# Patient Record
Sex: FEMALE | Race: WHITE | NOT HISPANIC OR LATINO | Employment: OTHER | URBAN - NONMETROPOLITAN AREA
[De-identification: names, ages, dates, MRNs, and addresses within clinical notes are randomized per-mention and may not be internally consistent; named-entity substitution may affect disease eponyms.]

---

## 2017-01-27 ENCOUNTER — CONSULT (OUTPATIENT)
Dept: CARDIOLOGY | Facility: CLINIC | Age: 67
End: 2017-01-27

## 2017-01-27 VITALS
WEIGHT: 190 LBS | HEART RATE: 64 BPM | SYSTOLIC BLOOD PRESSURE: 128 MMHG | DIASTOLIC BLOOD PRESSURE: 66 MMHG | BODY MASS INDEX: 33.66 KG/M2 | HEIGHT: 63 IN

## 2017-01-27 DIAGNOSIS — I48.0 PAROXYSMAL ATRIAL FIBRILLATION (HCC): Primary | ICD-10-CM

## 2017-01-27 DIAGNOSIS — R06.02 SHORTNESS OF BREATH: ICD-10-CM

## 2017-01-27 DIAGNOSIS — I10 ESSENTIAL HYPERTENSION: ICD-10-CM

## 2017-01-27 PROCEDURE — 93000 ELECTROCARDIOGRAM COMPLETE: CPT | Performed by: INTERNAL MEDICINE

## 2017-01-27 PROCEDURE — 99203 OFFICE O/P NEW LOW 30 MIN: CPT | Performed by: INTERNAL MEDICINE

## 2017-01-27 RX ORDER — LOSARTAN POTASSIUM AND HYDROCHLOROTHIAZIDE 12.5; 1 MG/1; MG/1
1 TABLET ORAL NIGHTLY
COMMUNITY
End: 2022-08-25 | Stop reason: DRUGHIGH

## 2017-01-27 RX ORDER — MELATONIN
1000 DAILY
COMMUNITY
End: 2017-06-27 | Stop reason: DRUGHIGH

## 2017-01-27 NOTE — PROGRESS NOTES
Geno Verduzco  1950  [phone]      01/27/17      [facility] CARDIOLOGY    Deena Jacobson, APRN  [address] [id number]    Chief Complaint   Patient presents with   • Atrial Fibrillation       Problem List:               1. Paroxysmal Atrial Fibrillation       A. CHADSVAsc = 2  On ASA      B. Echocardiogram 12/15/16: EF 55-60%, diastolic dysfunction, mild MR       C. GXT Cardiolite 9/15/09: normal exercise tolerance, no ischemia       D. 24 Hour Holter 2009: runs of PAF, no other arrythmias - initial diagnosis of atrial fibrillation      E. Sotalol and Tambocor caused side effects (worsened palpitations and apparent SVT)  2. Polymyalgia Rheumatica   3. HTN      History of Present Illness:   66 year old WF with history of PAF and HTN who has seen Dr. Mancilla in the past.  She has been doing very well from an atrial fibrillation standpoint. She is on Diltiazem and has not had an event for several years. She knows when she goes into atrial fibrillation, she feels rapid heart rates. The reason she presents today is to reestablish care and because she was having some SOB last fall. Since then, she has come off of prednisone which was used to treat PMR, and her SOB is gone. She saw Dr. Voss recently and had normal echo. Now, she is doing well with no SOB, BP, syncope, hospitalizations. Walks daily on the treadmill with no CP or SOB    Allergies   Allergen Reactions   • Cardizem [Diltiazem Hcl]      Is taking a blue color. Is allergic to the orange colored capsule.         Cannot display prior to admission medications because the patient has not been admitted in this contact.            Current Outpatient Prescriptions:   •  aspirin 81 MG EC tablet, Take 81 mg by mouth Daily., Disp: , Rfl:   •  cholecalciferol (VITAMIN D3) 1000 UNITS tablet, Take 1,000 Units by mouth Daily., Disp: , Rfl:   •  diltiaZEM (CARDIZEM) 120 MG tablet, Take 120 mg by mouth 2 (Two)  "Times a Day., Disp: , Rfl:   •  losartan-hydrochlorothiazide (HYZAAR) 100-12.5 MG per tablet, Take 0.5 tablets by mouth Daily., Disp: , Rfl:     Social History     Social History   • Marital status:      Spouse name: N/A   • Number of children: N/A   • Years of education: N/A     Social History Main Topics   • Smoking status: Never Smoker   • Smokeless tobacco: Never Used   • Alcohol use No   • Drug use: No   • Sexual activity: Not Asked     Other Topics Concern   • None     Social History Narrative       Family History   Problem Relation Age of Onset   • Heart disease Mother      PPM   • Hypertension Mother    • Stroke Mother    • Kidney failure Father    • Deep vein thrombosis Brother      Review of Systems   HENT: Negative.    Eyes: Negative.    Cardiovascular: Negative.    Respiratory: Negative.    Hematologic/Lymphatic: Negative.    Skin: Negative.    Musculoskeletal: Negative.    Gastrointestinal: Negative.    Genitourinary: Negative.    Neurological: Negative.    Psychiatric/Behavioral: Negative.        Vitals:    01/27/17 1259   BP: 128/66   BP Location: Left arm   Patient Position: Sitting   Pulse: 64   Weight: 190 lb (86.2 kg)   Height: 63\" (160 cm)       Physical Exam:  GEN: Well nourished, Well- developed  No acute distress  HEENT: Normocephalic, Atraumatic, PERRLA, moist mucous membranes  NECK: supple, NO JVD, no thyromegaly, no lymphadenopathy  CARD: S1S2  RRR no murmur, gallop, rub  LUNGS: Clear to auscultataion, normal respiratory effort  ABDOMEN: Soft, nontender, normal bowel sounds  EXTREMITIES:No gross deformities,  No clubbing, cyanosis, or edema  SKIN: Warm, dry  NEURO: No focal deficits  PSYCHIATRIC: Normal affect and mood      Data:                                        ECG 12 Lead  Date/Time: 1/27/2017 1:23 PM  Performed by: DEBORAH RUVALCABA  Authorized by: DEBORAH RUVALCABA   Rhythm: sinus rhythm  BPM: 64              Assessment and Plan:   1. Paroxysmal atrial fibrillation    2. " PAF (paroxysmal atrial fibrillation)    3. Essential hypertension    4. Shortness of breath      1. Paroxysmal Atrial Fibrillation- doing well on Diltiazem  CHADSvasc = 2 on ASA  2. HTN- controlled  3. SOB- now resolved, off prednisone, normal echocardiogram in December 2016    Scribed for Messi Mancilla MD by Aida Burgess PA-C. 1/27/2017  2:14 PM     I, Messi Mancilla MD, personally performed the services described in this documentation as scribed by the above named individual in my presence, and it is both accurate and complete.  1/27/2017  2:14 PM

## 2017-01-27 NOTE — LETTER
January 27, 2017     SHARITA Ruiz  [address] [address]    Patient: Geno Verduzco   YOB: 1950   Date of Visit: 1/27/2017       Dear SHARITA Cr:    Thank you for referring Geno Verduzco to me for evaluation. Below are the relevant portions of my assessment and plan of care.    If you have questions, please do not hesitate to call me. I look forward to following Geno along with you.         Sincerely,        Messi Mancilla MD        CC: No Recipients  Messi Mancilla MD  1/27/2017  2:16 PM  Sign at close encounter  Geno Verduzco  1950  [phone]      01/27/17      [facility] CARDIOLOGY    SHARITA Ruiz  [address] [place] [address] / [facility] [id number]    Chief Complaint   Patient presents with   • Atrial Fibrillation       Problem List:               1. Paroxysmal Atrial Fibrillation       A. CHADSVAsc = 2  On ASA      B. Echocardiogram 12/15/16: EF 55-60%, diastolic dysfunction, mild MR       C. GXT Cardiolite 9/15/09: normal exercise tolerance, no ischemia       D. 24 Hour Holter 2009: runs of PAF, no other arrythmias - initial diagnosis of atrial fibrillation      E. Sotalol and Tambocor caused side effects (worsened palpitations and apparent SVT)  2. Polymyalgia Rheumatica   3. HTN      History of Present Illness:   66 year old WF with history of PAF and HTN who has seen Dr. Mancilla in the past.  She has been doing very well from an atrial fibrillation standpoint. She is on Diltiazem and has not had an event for several years. She knows when she goes into atrial fibrillation, she feels rapid heart rates. The reason she presents today is to reestablish care and because she was having some SOB last fall. Since then, she has come off of prednisone which was used to treat PMR, and her SOB is gone. She saw Dr. Voss recently and had normal echo. Now, she is doing well with no SOB, BP, syncope, hospitalizations. Walks  "daily on the treadmill with no CP or SOB    Allergies   Allergen Reactions   • Cardizem [Diltiazem Hcl]      Is taking a blue color. Is allergic to the orange colored capsule.         Cannot display prior to admission medications because the patient has not been admitted in this contact.            Current Outpatient Prescriptions:   •  aspirin 81 MG EC tablet, Take 81 mg by mouth Daily., Disp: , Rfl:   •  cholecalciferol (VITAMIN D3) 1000 UNITS tablet, Take 1,000 Units by mouth Daily., Disp: , Rfl:   •  diltiaZEM (CARDIZEM) 120 MG tablet, Take 120 mg by mouth 2 (Two) Times a Day., Disp: , Rfl:   •  losartan-hydrochlorothiazide (HYZAAR) 100-12.5 MG per tablet, Take 0.5 tablets by mouth Daily., Disp: , Rfl:     Social History     Social History   • Marital status:      Spouse name: N/A   • Number of children: N/A   • Years of education: N/A     Social History Main Topics   • Smoking status: Never Smoker   • Smokeless tobacco: Never Used   • Alcohol use No   • Drug use: No   • Sexual activity: Not Asked     Other Topics Concern   • None     Social History Narrative       Family History   Problem Relation Age of Onset   • Heart disease Mother      PPM   • Hypertension Mother    • Stroke Mother    • Kidney failure Father    • Deep vein thrombosis Brother      Review of Systems   HENT: Negative.    Eyes: Negative.    Cardiovascular: Negative.    Respiratory: Negative.    Hematologic/Lymphatic: Negative.    Skin: Negative.    Musculoskeletal: Negative.    Gastrointestinal: Negative.    Genitourinary: Negative.    Neurological: Negative.    Psychiatric/Behavioral: Negative.        Vitals:    01/27/17 1259   BP: 128/66   BP Location: Left arm   Patient Position: Sitting   Pulse: 64   Weight: 190 lb (86.2 kg)   Height: 63\" (160 cm)       Physical Exam:  GEN: Well nourished, Well- developed  No acute distress  HEENT: Normocephalic, Atraumatic, PERRLA, moist mucous membranes  NECK: supple, NO JVD, no thyromegaly, no " lymphadenopathy  CARD: S1S2  RRR no murmur, gallop, rub  LUNGS: Clear to auscultataion, normal respiratory effort  ABDOMEN: Soft, nontender, normal bowel sounds  EXTREMITIES:No gross deformities,  No clubbing, cyanosis, or edema  SKIN: Warm, dry  NEURO: No focal deficits  PSYCHIATRIC: Normal affect and mood      Data:                                        ECG 12 Lead  Date/Time: 1/27/2017 1:23 PM  Performed by: DEBORAH MANCILLA  Authorized by: DEBORAH MANCILLA   Rhythm: sinus rhythm  BPM: 64              Assessment and Plan:   1. Paroxysmal atrial fibrillation    2. PAF (paroxysmal atrial fibrillation)    3. Essential hypertension    4. Shortness of breath      1. Paroxysmal Atrial Fibrillation- doing well on Diltiazem  CHADSvasc = 2 on ASA  2. HTN- controlled  3. SOB- now resolved, off prednisone, normal echocardiogram in December 2016    Scribed for Deborah Mancilla MD by Aida Burgess PA-C. 1/27/2017  2:14 PM     IDeborah MD, personally performed the services described in this documentation as scribed by the above named individual in my presence, and it is both accurate and complete.  1/27/2017  2:14 PM

## 2017-06-27 ENCOUNTER — OFFICE VISIT (OUTPATIENT)
Dept: CARDIOLOGY | Facility: CLINIC | Age: 67
End: 2017-06-27

## 2017-06-27 VITALS
WEIGHT: 187 LBS | HEIGHT: 63 IN | BODY MASS INDEX: 33.13 KG/M2 | HEART RATE: 60 BPM | SYSTOLIC BLOOD PRESSURE: 142 MMHG | DIASTOLIC BLOOD PRESSURE: 60 MMHG

## 2017-06-27 DIAGNOSIS — R00.2 PALPITATIONS: ICD-10-CM

## 2017-06-27 DIAGNOSIS — I48.0 PAF (PAROXYSMAL ATRIAL FIBRILLATION) (HCC): Primary | ICD-10-CM

## 2017-06-27 DIAGNOSIS — I10 ESSENTIAL HYPERTENSION: ICD-10-CM

## 2017-06-27 DIAGNOSIS — M35.3 POLYMYALGIA (HCC): ICD-10-CM

## 2017-06-27 DIAGNOSIS — R00.1 SINUS BRADYCARDIA: ICD-10-CM

## 2017-06-27 DIAGNOSIS — R06.02 SHORTNESS OF BREATH: ICD-10-CM

## 2017-06-27 PROCEDURE — 99213 OFFICE O/P EST LOW 20 MIN: CPT | Performed by: NURSE PRACTITIONER

## 2017-06-27 PROCEDURE — 93000 ELECTROCARDIOGRAM COMPLETE: CPT | Performed by: NURSE PRACTITIONER

## 2017-06-27 NOTE — PROGRESS NOTES
Chief Complaint   Patient presents with   • Follow-up     PCP writes refills on medication.   • Palpitations     She reports same as before, nothing any worse.       Subjective       Geno Verduzco is a 66 y.o. femalewho has history of hypertension and PAF for which she has seen Dr. Mancilla in the past. She has been treated with Cardizem and done fairly well. She has not followed up with Dr. Mancilla for many years. In 2016, she started noticing smothering sensation and increasing palpitation.  A regular stress test was done and everything was normal. Her previous cardiac workup was within normal limit. She also has been diagnosed with polymyalgia, treated with steroids during which time she gained a lot of weight and shortness of breath increased. After stopping the steroids, her weight improved and smothering sensation improved.   At her consultation with Dr. Voss in December 2016 a repeat echocardiogram was ordered and showed normal LV function and valvular structure. She was referred to Dr. Mancilla who felt she was stable and continued medical management.  Today she comes to the office for a follow up appointment and denies worsening palpitations or new cardiac symptoms.     HPI         Cardiac History:    Past Surgical History:   Procedure Laterality Date   • CARDIOVASCULAR STRESS TEST  09/15/2009    9 Min, 149 bpm. Negative   • CONVERTED (HISTORICAL) HOLTER  07/17/2009    AVG HR 71 BPM. PAF Seen   • ECHO - CONVERTED  07/03/2009    @[facility] Normal   • ECHO - CONVERTED  12/15/2016    Ef 55-60%, mild MR, RVSp 23 mmHg       Current Outpatient Prescriptions   Medication Sig Dispense Refill   • aspirin 81 MG EC tablet Take 81 mg by mouth 2 (Two) Times a Day.     • Cholecalciferol (VITAMIN D3) 5000 UNITS tablet tablet Take 5,000 Units by mouth 2 (Two) Times a Day.     • diltiaZEM (CARDIZEM) 120 MG tablet Take 120 mg by mouth 2 (Two) Times a Day.     • losartan-hydrochlorothiazide (HYZAAR) 100-12.5  MG per tablet Take 0.5 tablets by mouth Daily.       No current facility-administered medications for this visit.        Cardizem [diltiazem hcl]    Past Medical History:   Diagnosis Date   • Abnormal ECG    • Arrhythmia    • Bursitis    • Hypertension    • PAF (paroxysmal atrial fibrillation)    • Polymyalgia        Social History     Social History   • Marital status:      Spouse name: N/A   • Number of children: N/A   • Years of education: N/A     Occupational History   • Not on file.     Social History Main Topics   • Smoking status: Never Smoker   • Smokeless tobacco: Never Used   • Alcohol use No   • Drug use: No   • Sexual activity: Not on file     Other Topics Concern   • Not on file     Social History Narrative       Family History   Problem Relation Age of Onset   • Heart disease Mother      PPM   • Hypertension Mother    • Stroke Mother    • Kidney failure Father    • Deep vein thrombosis Brother        Review of Systems   Constitution: Negative for decreased appetite, fever and malaise/fatigue.   HENT: Negative for congestion and headaches.    Eyes: Negative for visual disturbance.   Cardiovascular: Positive for palpitations (mild). Negative for chest pain and dyspnea on exertion.   Respiratory: Negative for cough and shortness of breath.    Endocrine: Negative for polydipsia, polyphagia and polyuria.   Hematologic/Lymphatic: Negative for bleeding problem. Does not bruise/bleed easily.   Skin: Negative for color change and rash.   Musculoskeletal: Positive for arthritis, joint pain, joint swelling and myalgias. Negative for falls.   Gastrointestinal: Positive for hemorrhoids and melena. Negative for abdominal pain, constipation, heartburn and nausea.   Genitourinary: Negative for dysuria and hematuria.   Neurological: Negative for dizziness, light-headedness and numbness.   Psychiatric/Behavioral: The patient does not have insomnia and is not nervous/anxious.         Diabetes-  "No  Thyroid-normal    Objective     /60 (BP Location: Left arm)  Pulse 60  Ht 63\" (160 cm)  Wt 187 lb (84.8 kg)  BMI 33.13 kg/m2    Physical Exam   Constitutional: She is oriented to person, place, and time.   Eyes: Conjunctivae are normal. Pupils are equal, round, and reactive to light.   Neck: Neck supple. No JVD present. No thyromegaly present.   Cardiovascular: Normal rate, regular rhythm, S1 normal, S2 normal, normal heart sounds and normal pulses.    Pulmonary/Chest: Effort normal and breath sounds normal.   Abdominal: Soft. Bowel sounds are normal. She exhibits no distension. There is no tenderness.   Musculoskeletal: She exhibits no edema.   Neurological: She is alert and oriented to person, place, and time.   Skin: Skin is warm and dry.   Psychiatric: She has a normal mood and affect. Her behavior is normal. Judgment and thought content normal.   Vitals reviewed.           ECG 12 Lead  Date/Time: 6/27/2017 12:36 PM  Performed by: HAY GUAMAN  Authorized by: HAY GUAMAN   Comparison: compared with previous ECG from 12/13/2016  Comparison to previous ECG: sinus  Rhythm: sinus bradycardia  Rate: normal  BPM: 53  QRS axis: normal  Comments:  ms, QTc 379 ms                  Assessment/Plan      Geno was seen today for follow-up and palpitations.    Diagnoses and all orders for this visit:    PAF (paroxysmal atrial fibrillation)  -     ECG 12 Lead    Essential hypertension    Palpitations    Shortness of breath    Polymyalgia    Sinus bradycardia  -     ECG 12 Lead      MsKleber Verduzco remains stable from a cardiac standpoint. Her EKG shows sinus corby. Blood pressure stable. No changes to medication made today. She has developed issues with hemorrhoids and anticipates surgical intervention. If cardiac clearance needed she understands to contact the office.              Electronically signed by SHARITA Reddy,  June 27, 2017 5:00 PM  "

## 2017-10-13 ENCOUNTER — OFFICE VISIT (OUTPATIENT)
Dept: CARDIOLOGY | Facility: CLINIC | Age: 67
End: 2017-10-13

## 2017-10-13 VITALS
HEIGHT: 63 IN | HEART RATE: 58 BPM | DIASTOLIC BLOOD PRESSURE: 70 MMHG | SYSTOLIC BLOOD PRESSURE: 119 MMHG | BODY MASS INDEX: 33.66 KG/M2 | OXYGEN SATURATION: 100 % | WEIGHT: 190 LBS

## 2017-10-13 DIAGNOSIS — I48.0 PAF (PAROXYSMAL ATRIAL FIBRILLATION) (HCC): Primary | ICD-10-CM

## 2017-10-13 DIAGNOSIS — I10 ESSENTIAL HYPERTENSION: ICD-10-CM

## 2017-10-13 PROCEDURE — 99213 OFFICE O/P EST LOW 20 MIN: CPT | Performed by: INTERNAL MEDICINE

## 2017-10-13 NOTE — PROGRESS NOTES
"Geno Verduzco  1950  [phone]      10/13/2017    [facility] CARDIOLOGY     Davidperfectomynor Jacobson, APRN  [address] [id number]    Chief Complaint   Patient presents with   • Atrial Fibrillation       Problem List:            1. Paroxysmal Atrial Fibrillation       A. CHADSVAsc = 3  On ASA      B. Echocardiogram 12/15/16: EF 55-60%, diastolic dysfunction, mild MR       C. GXT Cardiolite 9/15/09: normal exercise tolerance, no ischemia       D. 24 Hour Holter 2009: runs of PAF, no other arrythmias - initial diagnosis of atrial fibrillation      E. Sotalol and Tambocor caused side effects (worsened palpitations and apparent SVT)  2. Polymyalgia Rheumatica   3. HTN       Allergies  Allergies   Allergen Reactions   • Bactrim [Sulfamethoxazole-Trimethoprim] Itching       Current Medications    Current Outpatient Prescriptions:   •  aspirin 81 MG EC tablet, Take 81 mg by mouth 2 (Two) Times a Day., Disp: , Rfl:   •  Cholecalciferol (VITAMIN D3) 5000 UNITS tablet tablet, Take 5,000 Units by mouth 2 (Two) Times a Day., Disp: , Rfl:   •  diltiaZEM (CARDIZEM) 120 MG tablet, Take 120 mg by mouth 2 (Two) Times a Day., Disp: , Rfl:   •  losartan-hydrochlorothiazide (HYZAAR) 100-12.5 MG per tablet, Take 0.5 tablets by mouth Daily., Disp: , Rfl:     History of Present Illness   HPI    Pt presents for follow up of AF/HTN. Since we last saw the pt, pt denies any AF episodes, SOB, CP, LH, and dizziness. Denies any hospitalizations, ER visits, bleeding, or TIA/CVA symptoms. Overall feels well. BP stable at home    ROS:  General:  Denies fatigue, weight gain or loss  Cardiovascular:  Denies CP, PND, syncope, near syncope, edema or palpitations.  Pulmonary:  Denies GIRON, cough, or wheezing      Vitals:    10/13/17 1254   BP: 119/70   BP Location: Left arm   Patient Position: Sitting   Pulse: 58   SpO2: 100%   Weight: 190 lb (86.2 kg)   Height: 63\" (160 cm)     PE:  General: NAD  Neck: no " JVD, no carotid bruits, no TM  Heart RRR, NL S1, S2, S4 present, no rubs, murmurs  Lungs: CTA, no wheezes, rhonchi, or rales  Abd: soft, non-tender, NL BS  Ext: No musculoskeletal deformities, no edema, cyanosis, or clubbing  Psych: normal mood and affect    Diagnostic Data:      Procedures    1. PAF (paroxysmal atrial fibrillation)    2. Essential hypertension          Plan:  1) PAF: Well controlled   Continue present medications.   2) Anticoagulation  Continue ASA, prn xarelto  3) HTN  Wt loss, exercise, salt reduction    F/up in 12 months

## 2018-01-25 ENCOUNTER — TELEPHONE (OUTPATIENT)
Dept: CARDIOLOGY | Facility: CLINIC | Age: 68
End: 2018-01-25

## 2018-01-25 NOTE — TELEPHONE ENCOUNTER
Pt calling to let us know she has been waking up with HR in the 50's and b/p 106/48. She is extremely tired, SOA. She takes Diltiazem 120mg 1 BID and Losartan/HCTZ 100mg/12.5mg 0.5mg daily. After talking to the pt she takes her losartan/ HCTZ around 1030-11pm at night. Advised pt to take her Losartan/HCTZ mid afternoon around 2-3pm and document her HR and B/P then call us back on Monday 29th.

## 2018-11-09 ENCOUNTER — OFFICE VISIT (OUTPATIENT)
Dept: CARDIOLOGY | Facility: CLINIC | Age: 68
End: 2018-11-09

## 2018-11-09 VITALS
DIASTOLIC BLOOD PRESSURE: 60 MMHG | HEIGHT: 63 IN | HEART RATE: 59 BPM | BODY MASS INDEX: 33.84 KG/M2 | WEIGHT: 191 LBS | SYSTOLIC BLOOD PRESSURE: 122 MMHG

## 2018-11-09 DIAGNOSIS — I48.0 PAF (PAROXYSMAL ATRIAL FIBRILLATION) (HCC): Primary | ICD-10-CM

## 2018-11-09 DIAGNOSIS — I10 ESSENTIAL HYPERTENSION: ICD-10-CM

## 2018-11-09 PROCEDURE — 99212 OFFICE O/P EST SF 10 MIN: CPT | Performed by: INTERNAL MEDICINE

## 2018-11-09 RX ORDER — MONTELUKAST SODIUM 10 MG/1
10 TABLET ORAL EVERY MORNING
COMMUNITY

## 2018-11-09 RX ORDER — ASCORBIC ACID 500 MG
500 TABLET ORAL DAILY
COMMUNITY
End: 2020-12-11

## 2018-11-09 NOTE — PROGRESS NOTES
Gneo Verduzco  1950  [phone]    11/09/2018    [facility] CARDIOLOGY     Deena Jacobson, APRN  [address] [id number]    Chief Complaint   Patient presents with   • Atrial Fibrillation       Problem List:             1. Paroxysmal Atrial Fibrillation       A. CHADSVAsc = 3  On ASA      B. Echocardiogram 12/15/16: EF 55-60%, diastolic dysfunction, mild MR       C. GXT Cardiolite 9/15/09: normal exercise tolerance, no ischemia       D. 24 Hour Holter 2009: runs of PAF, no other arrythmias - initial diagnosis of atrial fibrillation      E. Sotalol and Tambocor caused side effects (worsened palpitations and apparent SVT)  2. Polymyalgia Rheumatica   3. HTN       Allergies  Allergies   Allergen Reactions   • Bactrim [Sulfamethoxazole-Trimethoprim] Itching       Current Medications    Current Outpatient Prescriptions:   •  aspirin 81 MG EC tablet, Take 81 mg by mouth 2 (Two) Times a Day., Disp: , Rfl:   •  Cholecalciferol (VITAMIN D3) 5000 UNITS tablet tablet, Take 5,000 Units by mouth 2 (Two) Times a Day., Disp: , Rfl:   •  diltiaZEM (CARDIZEM) 120 MG tablet, Take 120 mg by mouth 2 (Two) Times a Day., Disp: , Rfl:   •  losartan-hydrochlorothiazide (HYZAAR) 100-12.5 MG per tablet, Take 0.5 tablets by mouth Daily., Disp: , Rfl:   •  montelukast (SINGULAIR) 10 MG tablet, Take 10 mg by mouth Every Night., Disp: , Rfl:   •  vitamin C (ASCORBIC ACID) 500 MG tablet, Take 500 mg by mouth Daily., Disp: , Rfl:     History of Present Illness     Pt presents for follow up of AF/HTN . Since we last saw the pt, pt denies any AF episodes, SOB, CP, LH, and dizziness. Denies any hospitalizations, ER visits, bleeding, or TIA/CVA symptoms. Overall feels well. BP at home stable. Rare skipped beats. Takes predsione at times due to PMR.    ROS:  General:  Denies fatigue, + weight gain   Cardiovascular:  Denies CP, PND, syncope, near syncope, edema + rare palpitations.  Pulmonary:   "Denies GIRON, cough, or wheezing      Vitals:    11/09/18 1104   BP: 122/60   BP Location: Left arm   Patient Position: Sitting   Pulse: 59   Weight: 86.6 kg (191 lb)   Height: 160 cm (63\")     Body mass index is 33.83 kg/m².  PE:  General: NAD  Neck: no JVD, no carotid bruits, no TM  Heart RRR, NL S1, S2, S4 present, no rubs, murmurs  Lungs: CTA, no wheezes, rhonchi, or rales  Abd: soft, non-tender, NL BS  Ext: No musculoskeletal deformities, no edema, cyanosis, or clubbing  Psych: normal mood and affect    Diagnostic Data:      Procedures    1. PAF (paroxysmal atrial fibrillation) (CMS/HCC)    2. Essential hypertension          Plan:  1) PAF: no recurrence  Continue present medications.   2) Anticoagulation  Continue ASA  3) HTN: well controlled on meds  Wt loss, exercise, salt reduction    F/up in 12 months      "

## 2019-11-08 ENCOUNTER — OFFICE VISIT (OUTPATIENT)
Dept: CARDIOLOGY | Facility: CLINIC | Age: 69
End: 2019-11-08

## 2019-11-08 VITALS
BODY MASS INDEX: 32.78 KG/M2 | HEIGHT: 63 IN | DIASTOLIC BLOOD PRESSURE: 77 MMHG | SYSTOLIC BLOOD PRESSURE: 125 MMHG | WEIGHT: 185 LBS | HEART RATE: 68 BPM

## 2019-11-08 DIAGNOSIS — I48.0 PAF (PAROXYSMAL ATRIAL FIBRILLATION) (HCC): Primary | ICD-10-CM

## 2019-11-08 DIAGNOSIS — I10 ESSENTIAL HYPERTENSION: ICD-10-CM

## 2019-11-08 PROCEDURE — 99213 OFFICE O/P EST LOW 20 MIN: CPT | Performed by: INTERNAL MEDICINE

## 2019-11-08 NOTE — PROGRESS NOTES
Geno Verduzco  1950  Home phone not available    11/08/2019    [facility] CARDIOLOGY     Deena Jacobson, APRN  [address] [place] [id number]    Chief Complaint   Patient presents with   • Paroxysmal atrial fibrillation        Problem List:             1. Paroxysmal Atrial Fibrillation       A. CHADSVAsc = 3  On ASA      B. Echocardiogram 12/15/16: EF 55-60%, diastolic dysfunction, mild MR       C. GXT Cardiolite 9/15/09: normal exercise tolerance, no ischemia       D. 24 Hour Holter 2009: runs of PAF, no other arrythmias - initial diagnosis of atrial fibrillation      E. Sotalol and Tambocor caused side effects (worsened palpitations and apparent SVT)  2. Polymyalgia Rheumatica   3. HTN         Allergies  Allergies   Allergen Reactions   • Bactrim [Sulfamethoxazole-Trimethoprim] Itching       Current Medications    Current Outpatient Medications:   •  aspirin 81 MG EC tablet, Take 81 mg by mouth 2 (Two) Times a Day., Disp: , Rfl:   •  Cholecalciferol (VITAMIN D3) 5000 UNITS tablet tablet, Take 5,000 Units by mouth 2 (Two) Times a Day., Disp: , Rfl:   •  diltiaZEM (CARDIZEM) 120 MG tablet, Take 120 mg by mouth 2 (Two) Times a Day., Disp: , Rfl:   •  losartan-hydrochlorothiazide (HYZAAR) 100-12.5 MG per tablet, Take 0.5 tablets by mouth Daily., Disp: , Rfl:   •  montelukast (SINGULAIR) 10 MG tablet, Take 10 mg by mouth Every Night., Disp: , Rfl:   •  vitamin C (ASCORBIC ACID) 500 MG tablet, Take 500 mg by mouth Daily., Disp: , Rfl:     History of Present Illness     Pt presents for follow up of AF, HTN. Since we last saw the pt, pt denies any AF episodes, SOB, CP, LH, and dizziness. Denies any hospitalizations, ER visits, bleeding, or TIA/CVA symptoms. Overall feels well.BP is well controlled.     ROS:  General:  Denies fatigue, weight gain or loss  Cardiovascular:  Denies CP, PND, syncope, near syncope, edema or palpitations.  Pulmonary:  Denies GIRON, cough, or  "wheezing      Vitals:    11/08/19 1341   BP: 125/77   BP Location: Left arm   Patient Position: Sitting   Pulse: 68   Weight: 83.9 kg (185 lb)   Height: 160 cm (63\")     Body mass index is 32.77 kg/m².  PE:  General: NAD. A & O x 3  Neck: no JVD, no carotid bruits, no TM  Heart RRR, NL S1, S2, S4 present, no rubs, murmurs  Lungs: CTA, no wheezes, rhonchi, or rales  Abd: soft, non-tender, NL BS  Ext: No musculoskeletal deformities, no edema, cyanosis, or clubbing  Psych: normal mood and affect    Diagnostic Data:      Procedures    1. PAF (paroxysmal atrial fibrillation) (CMS/HCC)    2. Essential hypertension          Plan:  1) PAF: no recurrence  Continue present medications.   2) Anticoagulation  Continue ASA  3) HTN: well controlled on meds  Wt loss, exercise, salt reduction    F/up in 12 months    Electronically signed by ANDREW Thomas, 11/08/19, 2:09 PM.    I, Messi Mancilla MD, personally performed the services described in this documentation as scribed by the above named individual in my presence, and it is both accurate and complete.  11/8/2019  2:19 PM    "

## 2020-12-11 ENCOUNTER — OFFICE VISIT (OUTPATIENT)
Dept: CARDIOLOGY | Facility: CLINIC | Age: 70
End: 2020-12-11

## 2020-12-11 VITALS
OXYGEN SATURATION: 97 % | SYSTOLIC BLOOD PRESSURE: 128 MMHG | WEIGHT: 180 LBS | BODY MASS INDEX: 31.89 KG/M2 | HEART RATE: 71 BPM | DIASTOLIC BLOOD PRESSURE: 60 MMHG | HEIGHT: 63 IN

## 2020-12-11 DIAGNOSIS — M35.3 POLYMYALGIA (HCC): ICD-10-CM

## 2020-12-11 DIAGNOSIS — I10 ESSENTIAL HYPERTENSION: ICD-10-CM

## 2020-12-11 DIAGNOSIS — R06.02 SHORTNESS OF BREATH: ICD-10-CM

## 2020-12-11 DIAGNOSIS — I48.0 PAF (PAROXYSMAL ATRIAL FIBRILLATION) (HCC): Primary | ICD-10-CM

## 2020-12-11 DIAGNOSIS — R00.2 PALPITATIONS: ICD-10-CM

## 2020-12-11 PROCEDURE — 99214 OFFICE O/P EST MOD 30 MIN: CPT | Performed by: INTERNAL MEDICINE

## 2020-12-11 RX ORDER — DILTIAZEM HYDROCHLORIDE 180 MG/1
180 CAPSULE, COATED, EXTENDED RELEASE ORAL 2 TIMES DAILY
COMMUNITY

## 2020-12-11 RX ORDER — MULTIPLE VITAMINS W/ MINERALS TAB 9MG-400MCG
1 TAB ORAL DAILY
COMMUNITY

## 2020-12-11 NOTE — PROGRESS NOTES
Cardiac Electrophysiology Outpatient Follow Up Note            [facility] at [facility]    Follow Up Office Visit      Geno Verduzco  [id number]  2020  [unfilled]  [unfilled]    Primary Care Physician: Deena Jacobson APRN    Referred By: No ref. provider found    Subjective     Chief Complaint: Paroxysmal atrial fibrillation, palpitations, hypertension  Chief Complaint   Patient presents with   • PAF     Problem List:             1. Paroxysmal Atrial Fibrillation       A. CHADSVAsc = 3  On ASA      B. Echocardiogram 12/15/16: EF 55-60%, diastolic dysfunction, mild MR       C. GXT Cardiolite 9/15/09: normal exercise tolerance, no ischemia       D. 24 Hour Holter : runs of PAF, no other arrythmias - initial diagnosis of atrial fibrillation      E. Sotalol and Tambocor caused side effects (worsened palpitations and apparent SVT)  2. Polymyalgia Rheumatica   3. HTN         History of Present Illness:   Geno Verduzco is a 70 y.o. female who presents to my electrophysiology clinic for follow up of the above complaints.  She is overall doing well.  She occasionally has some palpitations.  Been several years since she was noted to have a full episode of atrial fibrillation.  She remembers A. fib episodes were associated with severe palpitations this was at the time of the death of her  and father.  Since then she thinks that she has had really no severe episodes like that but does note that she has had episodes of feeling her heartbeat irregularly erratically especially in the evening but sometimes during the day.  The episodes are now a several minutes.  No shortness of breath associated with them.  She wonders if there is fibrillation.  She has had several family members who have really suffered and even  from stroke due to atrial fibrillation and she is very afraid about this.    No chest pain nausea vomiting fevers or chills syncope  presyncope    Review of Systems:   Constitutional: No fevers or chills, no recent weight gain or weight loss or fatigue  Eyes: No visual loss, blurred vision, double vision, yellow sclerae.  ENT: No headaches, hearing loss, vertigo, congestion or sore throat.   Cardiovascular: Per HPI  Respiratory: No cough or wheezing, no sputum production, no hematemesis   Gastrointestinal: No abdominal pain, no nausea, vomiting, constipation, diarrhea, melena.   Genitourinary: No dysuria, hematuria or increased frequency.  Musculoskeletal:  No gait disturbance, weakness or joint pain or stiffness  Integumentary: No rashes, urticaria, ulcers or sores.   Neurological: No headache, dizziness, syncope, paralysis, ataxia, no prior CVA/TIA  Psychiatric: No anxiety, or depression  Endocrine: No diaphoresis, cold or heat intolerance. No polyuria or polydipsia.   Hematologic/Lymphatic: No anemia, abnormal bruising or bleeding. No history of DVT/PE.      Past Medical History:   Past Medical History:   Diagnosis Date   • Abnormal ECG    • Arrhythmia    • Bursitis    • Hypertension    • PAF (paroxysmal atrial fibrillation) (CMS/HCC)    • Polymyalgia (CMS/HCC)        Past Surgical History:   Past Surgical History:   Procedure Laterality Date   • CARDIOVASCULAR STRESS TEST  09/15/2009    9 Min, 149 bpm. Negative   • CONVERTED (HISTORICAL) HOLTER  07/17/2009    AVG HR 71 BPM. PAF Seen   • ECHO - CONVERTED  07/03/2009    @[facility] Normal   • ECHO - CONVERTED  12/15/2016    Ef 55-60%, mild MR, RVSp 23 mmHg       Family History:   Family History   Problem Relation Age of Onset   • Heart disease Mother         PPM   • Hypertension Mother    • Stroke Mother    • Kidney failure Father    • Deep vein thrombosis Brother        Social History:   Social History     Socioeconomic History   • Marital status:      Spouse name: Not on file   • Number of children: Not on file   • Years of education: Not on file   • Highest education level: Not  "on file   Tobacco Use   • Smoking status: Never Smoker   • Smokeless tobacco: Never Used   Substance and Sexual Activity   • Alcohol use: No   • Drug use: No   • Sexual activity: Defer       Medications:     Current Outpatient Medications:   •  aspirin 81 MG EC tablet, Take 81 mg by mouth 2 (Two) Times a Day., Disp: , Rfl:   •  Cholecalciferol (VITAMIN D3) 5000 UNITS tablet tablet, Take 5,000 Units by mouth 2 (Two) Times a Day., Disp: , Rfl:   •  dilTIAZem CD (CARDIZEM CD) 180 MG 24 hr capsule, Take 180 mg by mouth 2 (two) times a day., Disp: , Rfl:   •  losartan-hydrochlorothiazide (HYZAAR) 100-12.5 MG per tablet, Take 0.5 tablets by mouth Every Night., Disp: , Rfl:   •  montelukast (SINGULAIR) 10 MG tablet, Take 10 mg by mouth Every Night., Disp: , Rfl:   •  multivitamin with minerals (Multivitamin Adults 50+) tablet tablet, Take 1 tablet by mouth Daily., Disp: , Rfl:     Allergies:   Allergies   Allergen Reactions   • Bactrim [Sulfamethoxazole-Trimethoprim] Itching       Objective     Physical Exam:  Vital Signs:   Vitals:    12/11/20 1342   BP: 128/60   BP Location: Right arm   Patient Position: Sitting   Pulse: 71   SpO2: 97%   Weight: 81.6 kg (180 lb)   Height: 160 cm (63\")     GEN: Well nourished, well-developed, no acute distress  HEENT: Normocephalic, atraumatic, moist mucous membranes  NECK: Supple, no JVD, no thyromegaly, no lymphadenopathy  CARD: Regular rate and rhythm, normal S1 & S2 are present.  No murmur, gallop or rubs are appreciated.  LUNGS: Clear to auscultation bilateraly, normal respiratory effort  ABDOMEN: Soft, nontender, normal bowel sounds  EXTREMITIES: No gross deformities, no clubbing, cyanosis.  Edema none  SKIN: Warm, dry  NEURO: No focal deficits, alert and oriented x 3  PSYCHIATRIC: Normal affect and mood, appropriate use of semantics and logic.        No results found for: GLUCOSE, CALCIUM, NA, K, CO2, CL, BUN, CREATININE, EGFRIFAFRI, EGFRIFNONA, BCR, ANIONGAP  No results found for: " WBC, HGB, HCT, MCV, PLT  No results found for: INR, PROTIME  No results found for: TSH, F9CBNXX, U5YBAPM, THYROIDAB    Cardiac Testing:     I personally viewed and interpreted the patient's EKG/Telemetry/lab data    Procedures    Tobacco Cessation: N/A  Obstructive Sleep Apnea Screening: N/A    Assessment & Plan        Diagnoses and all orders for this visit:    1. PAF (paroxysmal atrial fibrillation) (CMS/HCC) (Primary)    2. Essential hypertension    3. Shortness of breath    4. Palpitations    5. Polymyalgia (CMS/HCC)         Diagnosis Plan   1. PAF (paroxysmal atrial fibrillation) (CMS/HCC)   ongoing symptoms of palpitations these may or may not be due to atrial fibrillation.  She has [id number] of 3.  If she does indeed have atrial fibrillation this would lead us towards anticoagulating her once again.  We lengthy conversation.  Unless very able to document atrial fibrillation she really does not want to start anticoagulation.  This seems reasonable.  We discussed the option of continued heart rhythm monitoring with an ambulatory device versus implanted loop monitor.  After very careful discussion the risk-benefit profile she wishes to proceed with implanted loop monitor.  I think this is quite reasonable as it would answer the question of should she be anticoagulated.   2. Essential hypertension   blood pressure well controlled at this time.  Home blood pressure vital signs were reviewed.   3. Shortness of breath   this does not seem to be related to arrhythmia.  This seems to occur in the morning that subsides with any ambulation.   4. Palpitations   noted above.   5. Polymyalgia (CMS/HCC)   no issues at this time.         Follow Up:     All over with her in a year.  Schedule her for an implanted loop monitor in the near future in [place]..      Thank you for allowing me to participate in the care of your patient. Please to not hesitate to contact me with additional questions or concerns.        Jimenez ARAMBULA  DO Vasu, FACC, RS  Cardiac Electrophysiologist

## 2020-12-30 ENCOUNTER — PREP FOR SURGERY (OUTPATIENT)
Dept: OTHER | Facility: HOSPITAL | Age: 70
End: 2020-12-30

## 2020-12-30 DIAGNOSIS — I48.0 PAROXYSMAL ATRIAL FIBRILLATION (HCC): Primary | ICD-10-CM

## 2020-12-30 RX ORDER — SODIUM CHLORIDE 0.9 % (FLUSH) 0.9 %
1-10 SYRINGE (ML) INJECTION AS NEEDED
Status: CANCELLED | OUTPATIENT
Start: 2020-12-30

## 2020-12-30 RX ORDER — NITROGLYCERIN 0.4 MG/1
0.4 TABLET SUBLINGUAL
Status: CANCELLED | OUTPATIENT
Start: 2020-12-30

## 2020-12-30 RX ORDER — ACETAMINOPHEN 325 MG/1
650 TABLET ORAL EVERY 4 HOURS PRN
Status: CANCELLED | OUTPATIENT
Start: 2020-12-30

## 2020-12-30 RX ORDER — SODIUM CHLORIDE 0.9 % (FLUSH) 0.9 %
3 SYRINGE (ML) INJECTION EVERY 12 HOURS SCHEDULED
Status: CANCELLED | OUTPATIENT
Start: 2020-12-30

## 2021-02-10 ENCOUNTER — HOSPITAL ENCOUNTER (OUTPATIENT)
Dept: CARDIOLOGY | Facility: HOSPITAL | Age: 71
Discharge: HOME | End: 2021-02-10
Attending: INTERNAL MEDICINE | Admitting: INTERNAL MEDICINE

## 2021-02-10 VITALS
RESPIRATION RATE: 16 BRPM | OXYGEN SATURATION: 99 % | WEIGHT: 186 LBS | HEIGHT: 63 IN | SYSTOLIC BLOOD PRESSURE: 157 MMHG | TEMPERATURE: 98 F | DIASTOLIC BLOOD PRESSURE: 67 MMHG | BODY MASS INDEX: 32.96 KG/M2 | HEART RATE: 62 BPM

## 2021-02-10 DIAGNOSIS — I48.0 PAF (PAROXYSMAL ATRIAL FIBRILLATION) (HCC): ICD-10-CM

## 2021-02-10 PROCEDURE — 33285 INSJ SUBQ CAR RHYTHM MNTR: CPT | Performed by: INTERNAL MEDICINE

## 2021-02-10 PROCEDURE — C1764 EVENT RECORDER, CARDIAC: HCPCS

## 2021-02-10 PROCEDURE — 33285 INSJ SUBQ CAR RHYTHM MNTR: CPT

## 2021-02-10 NOTE — H&P
Cardiac Electrophysiology History and Physical          [facility]    History & Physical      PATIENT NAME  Geno Verduzco    :  1950 AGE: 70 y.o.    ADMISSION DATE: 2/10/2021     Subjective      CC:  Palpitations    PROBLEM LIST:    1. Paroxysmal atrial fibrillation  a. CHADSVASc = 3, on aspirin only  b. Diagnosed  on 24 Hour Holter with runs of PAF, no other arrythmias   c. GXT Cardiolite 9/15/09: normal exercise tolerance, no ischemia.  d. Failed Sotalol and Tambocor due to side effects (worsened palpitations and apparent SVT)  e. Echocardiogram 12/15/16: EF 55-60%, diastolic dysfunction, mild MR.  f. Rate controlled on Cardizem therapy without documented recurrence.  2. Essential hypertension  3. Polymyalgia rheumatica  4. Bursitis      HISTORY OF PRESENT ILLNESS: Ms. Geno Verduzco is a 70-year-old white female who presents today for elective loop recorder device implant.  Patient was recently seen in EP follow-up regarding her past medical history of atrial fibrillation with reported increased palpitations without documented episodes of recurrent atrial fibrillation to warrant initiation of long-term anticoagulation therapy.  Options for continued monitoring were reviewed with decision to pursue loop recorder implant for which she presents for today.  Upon presentation patient reports doing well overall from a cardiovascular standpoint.  Patient continues to admit to intermittent palpitations lasting up to several minutes in duration 3-4 times per week.  Patient denies associated symptoms of chest pain, dizziness, shortness of breath, or presyncope.  Patient denies TIA/strokelike symptoms.  Patient denies recent infections or signs of fever, chills, sweats, or cough.  Patient denies recent sick contacts.      PAST MEDICAL HISTORY  Past Medical History:   Diagnosis Date   • Abnormal ECG    • Arrhythmia    • Bursitis    • Hypertension    • PAF  (paroxysmal atrial fibrillation) (CMS/[facility])    • Polymyalgia (CMS/HCC)        SURGICAL HISTORY   has a past surgical history that includes Cardiovascular stress test (09/15/2009); Echo - Converted (07/03/2009); converted (historical) holter (07/17/2009); Echo - Converted (12/15/2016); and Foot surgery (Left).     SOCIAL HISTORY  Social History     Socioeconomic History   • Marital status:      Spouse name: Not on file   • Number of children: Not on file   • Years of education: Not on file   • Highest education level: Not on file   Tobacco Use   • Smoking status: Never Smoker   • Smokeless tobacco: Never Used   Substance and Sexual Activity   • Alcohol use: No   • Drug use: No   • Sexual activity: Defer       FAMILY HISTORY  family history includes Deep vein thrombosis in her brother; Heart disease in her mother; Hypertension in her mother; Kidney failure in her father; Stroke in her mother.     MEDICATIONS  Prior to Admission medications    Medication Sig Start Date End Date Taking? Authorizing Provider   aspirin 81 MG EC tablet Take 81 mg by mouth Daily.   Yes Khai Ng MD   Cholecalciferol (VITAMIN D3) 5000 UNITS tablet tablet Take 5,000 Units by mouth 2 (Two) Times a Day.   Yes Khai Ng MD   dilTIAZem CD (CARDIZEM CD) 180 MG 24 hr capsule Take 180 mg by mouth 2 (two) times a day.   Yes Khai Ng MD   losartan-hydrochlorothiazide (HYZAAR) 100-12.5 MG per tablet Take 0.5 tablets by mouth Every Night.   Yes Khai Ng MD   montelukast (SINGULAIR) 10 MG tablet Take 10 mg by mouth Every Night.   Yes Khai Ng MD   multivitamin with minerals (Multivitamin Adults 50+) tablet tablet Take 1 tablet by mouth Daily.   Yes Khai Ng MD       ALLERGIES  Allergies   Allergen Reactions   • Bactrim [Sulfamethoxazole-Trimethoprim] Itching       REVIEW OF SYSTEMS  Constitutional: No fevers or chills, no recent weight gain or weight loss or fatigue  Eyes:  "No visual loss, blurred vision, double vision, yellow sclerae.  ENT: No headaches, hearing loss, vertigo, congestion or sore throat.   Cardiovascular: Per HPI  Respiratory: No cough or wheezing, no sputum production, no hematemesis   Gastrointestinal: No abdominal pain, no nausea, vomiting, constipation, diarrhea, melena.   Genitourinary: No dysuria, hematuria or increased frequency.  Musculoskeletal:  No gait disturbance, weakness or joint pain or stiffness  Integumentary: No rashes, urticaria, ulcers or sores.   Neurological: No headache, dizziness, syncope, paralysis, ataxia, no prior CVA/TIA  Psychiatric: No anxiety, or depression  Endocrine: No diaphoresis, cold or heat intolerance. No polyuria or polydipsia.   Hematologic/Lymphatic: No anemia, abnormal bruising or bleeding. No history of DVT/PE.      Objective      PHYSICAL EXAM    Vital Signs: /67 mmHg, HR 55 bpm, SPO2 99% RA.    General appearance: Awake, alert, cooperative  Head: Normocephalic, without obvious abnormality, atraumatic  Eyes: Conjunctivae/corneas clear, EOM's intact  Neck: no adenopathy, no carotid bruit, no JVD and thyroid: not enlarged  Lungs: clear to auscultation bilaterally and no rhonchi or crackles\", ' symmetric  Heart: regular rate and rhythm, S1, S2 normal, no murmur, click, rub or gallop  Abdomen: Soft, non-tender. Bowel sounds normal,  no organomegaly  Extremities: extremities normal, atraumatic, no cyanosis or edema  Skin: Skin color, turgor normal, no rashes or lesions  Neurologic: Grossly normal           TELEMETRY: SB 55 bpm    Assessment & Plan     Ms. Geno Verduzco is a 70-year-old white female who presents today for elective loop recorder device implant.  Patient was recently seen in EP follow-up regarding her past medical history of atrial fibrillation with reported increased palpitations without documented episodes of recurrent atrial fibrillation to warrant initiation of long-term anticoagulation therapy.  " Options for continued monitoring were reviewed with decision to pursue loop recorder implant for which she presents for today.  All risk, benefits, and alternatives to the procedure were outlined reviewed with patient in clinic and again here at bedside today.  Patient verbalizes complete understanding of the procedure and is willing to proceed as scheduled.  Patient has a negative Covid screening documented with the last 72 hours.      Electronically signed by SHARITA Roa, 02/10/21, 11:53 AM EST.      This note was completed using a voice transcription system. Every effort was made to ensure accuracy. However, inadvertent computerized transcription errors may be present.

## 2021-02-10 NOTE — OP NOTE
Cardiac Electrophysiology Procedure Note          [facility]    PROCEDURE: CARDIAC MEMORY LOOP RECORDER IMPLANTATION (Implanted Loop Monitor or ILR)    OPERATION PERFORMED: Implantation of a Medtronic cardiac memory loop recorder with serial number anterior at the anterior chest wall.    ATTENDING SURGEON: Jimenez Leone DO    ANESTHESIA: local anesthesia with lidocaine and bupivicaine were administered subcutaneously to the implant area.     ESTIMATED BLOOD LOSS: minimal (less than 5 cc)    COMPLICATIONS: None.  TIME OUT: Time out was completed with verification of the correct patient identity, procedure to be performed, procedure site and implanted equipment.    INDICATION FOR PROCEDURE:  Briefly, the patient is aditi Verduzco is a 70 y.o. year old female with a history of paroxysmal atrial fibrillation.  The patient was seen and examined by an electrophysiology staff physician and deemed appropriate for implantation of a memory loop recorder.    The patient was able to give written informed consent after revisiting the key portions of the risk versus benefit profile of the procedure.  This discussion was framed by our lengthy conversations  (please see our detailed notes).  Patient verbalized strong understanding of this discussion and a strong desire to proceed with the procedure.  Please note that this detailed informed consent process utilized mutual and shared decision making process between all parties involved, principally the physician and patient, but also potentially with input from the patient's selected family and friends.    PROCEDURE AND FINDINGS:  The patient was brought to the electrophysiology suite.  Informed consent was obtained prior to the procedure and confirmed.  The site of implantation was prepped and draped in the usual sterile fashion.   Adequate local anesthesia was given: the skin at the site of implantation was infiltrated with 1%  lidocaine and 0.5% bupivicaine 50/50 mixture.    The Medtronic ILR was percutaneously injected into the subcutaneous space using the supplied implant tools in routine fashion.  The wound was closed with surgical adhesive and a sterile occlusive dressing.    CONCLUSION:  Successful implantation of cardiac memory loop recorder system.      FOLLOW UP AND RECOMMENDATIONS:  The patient may remove the dressing later today.  The patient may shower later today.  No wound check is required.   The patient will follow up with  Me as scheduled.

## 2021-02-12 ENCOUNTER — TELEPHONE (OUTPATIENT)
Dept: CARDIOLOGY | Facility: CLINIC | Age: 71
End: 2021-02-12

## 2021-02-12 NOTE — TELEPHONE ENCOUNTER
Patient had a loop recorder placed on 2/10/21. She states that the area is bruised and swollen. She noticed today that she specifically has a swollen, tender knot that is about one inch long at the incision site. She denies any redness, red streaks, drainage, fever or chills. She is going to monitor the area over the weekend and call us back on Monday if she has any more problems or concerns.

## 2021-03-01 ENCOUNTER — TELEPHONE (OUTPATIENT)
Dept: CARDIOLOGY | Facility: CLINIC | Age: 71
End: 2021-03-01

## 2021-03-01 NOTE — TELEPHONE ENCOUNTER
Pt called to inform the device clinic that she was diagnosed with COVID-19 2/18/21 and was then hospitalized with  bilateral COVID-19 related  pneumonia 2/22 thru 2/28/2021. Pt states she's still short of breath & weak but is slowly improving.

## 2021-03-23 PROCEDURE — 93298 REM INTERROG DEV EVAL SCRMS: CPT | Performed by: INTERNAL MEDICINE

## 2021-03-23 PROCEDURE — G2066 INTER DEVC REMOTE 30D: HCPCS | Performed by: INTERNAL MEDICINE

## 2021-05-07 ENCOUNTER — OFFICE VISIT (OUTPATIENT)
Dept: CARDIOLOGY | Facility: CLINIC | Age: 71
End: 2021-05-07

## 2021-05-07 VITALS
BODY MASS INDEX: 31.89 KG/M2 | DIASTOLIC BLOOD PRESSURE: 70 MMHG | WEIGHT: 180 LBS | HEART RATE: 68 BPM | HEIGHT: 63 IN | SYSTOLIC BLOOD PRESSURE: 140 MMHG | OXYGEN SATURATION: 98 % | TEMPERATURE: 97.7 F

## 2021-05-07 DIAGNOSIS — I48.0 PAF (PAROXYSMAL ATRIAL FIBRILLATION) (HCC): Primary | ICD-10-CM

## 2021-05-07 DIAGNOSIS — R06.02 SHORTNESS OF BREATH: ICD-10-CM

## 2021-05-07 DIAGNOSIS — I10 ESSENTIAL HYPERTENSION: ICD-10-CM

## 2021-05-07 DIAGNOSIS — R00.2 PALPITATIONS: ICD-10-CM

## 2021-05-07 PROCEDURE — 99213 OFFICE O/P EST LOW 20 MIN: CPT | Performed by: INTERNAL MEDICINE

## 2021-05-07 NOTE — PROGRESS NOTES
Cardiac Electrophysiology Outpatient Follow Up Note            [facility]    Follow Up Office Visit      Geno Verduzco  [id number]  2021  [unfilled]  [unfilled]    Primary Care Physician: Pawel Brunner MD    Referred By: No ref. provider found    Subjective     Chief Complaint:   Diagnoses and all orders for this visit:    1. PAF (paroxysmal atrial fibrillation) (CMS/HCC) (Primary)    2. Essential hypertension    3. Palpitations    4. Shortness of breath      Chief Complaint   Patient presents with   • Atrial Fibrillation       History of Present Illness:   Geno Verduzco is a 70 y.o. female who presents to my electrophysiology clinic for follow up of the above complaints.  Geno is doing well.  She had a very hard spell with Covid a week after we gave her her loop monitor.  She says she nearly  she was in the hospital for nearly a month.  She is not been the same since.    No chest pain nausea vomiting fevers or chills.  No loop monitor events..      Review of Systems:   Constitutional: No fevers or chills, no recent weight gain or weight loss or fatigue  Eyes: No visual loss, blurred vision, double vision, yellow sclerae.  ENT: No headaches, hearing loss, vertigo, congestion or sore throat.   Cardiovascular: Per HPI  Respiratory: No cough or wheezing, no sputum production, no hematemesis   Gastrointestinal: No abdominal pain, no nausea, vomiting, constipation, diarrhea, melena.   Genitourinary: No dysuria, hematuria or increased frequency.  Musculoskeletal:  No gait disturbance, weakness or joint pain or stiffness  Integumentary: No rashes, urticaria, ulcers or sores.   Neurological: No headache, dizziness, syncope, paralysis, ataxia, no prior CVA/TIA  Psychiatric: No anxiety, or depression  Endocrine: No diaphoresis, cold or heat intolerance. No polyuria or polydipsia.   Hematologic/Lymphatic: No anemia, abnormal bruising or bleeding. No  history of DVT/PE.      Past Medical History:   Past Medical History:   Diagnosis Date   • Abnormal ECG    • Arrhythmia    • Bursitis    • Hypertension    • PAF (paroxysmal atrial fibrillation) (CMS/HCC)    • Polymyalgia (CMS/HCC)        Past Surgical History:   Past Surgical History:   Procedure Laterality Date   • CARDIOVASCULAR STRESS TEST  09/15/2009    9 Min, 149 bpm. Negative   • CONVERTED (HISTORICAL) HOLTER  07/17/2009    AVG HR 71 BPM. PAF Seen   • ECHO - CONVERTED  07/03/2009    @[facility] Normal   • ECHO - CONVERTED  12/15/2016    Ef 55-60%, mild MR, RVSp 23 mmHg   • FOOT SURGERY Left        Family History:   Family History   Problem Relation Age of Onset   • Heart disease Mother         PPM   • Hypertension Mother    • Stroke Mother    • Kidney failure Father    • Deep vein thrombosis Brother        Social History:   Social History     Socioeconomic History   • Marital status:      Spouse name: Not on file   • Number of children: Not on file   • Years of education: Not on file   • Highest education level: Not on file   Tobacco Use   • Smoking status: Never Smoker   • Smokeless tobacco: Never Used   Vaping Use   • Vaping Use: Never used   Substance and Sexual Activity   • Alcohol use: No   • Drug use: No   • Sexual activity: Defer       Medications:     Current Outpatient Medications:   •  aspirin 81 MG EC tablet, Take 81 mg by mouth Daily., Disp: , Rfl:   •  Cholecalciferol (VITAMIN D3) 5000 UNITS tablet tablet, Take 5,000 Units by mouth 2 (Two) Times a Day., Disp: , Rfl:   •  dilTIAZem CD (CARDIZEM CD) 180 MG 24 hr capsule, Take 180 mg by mouth 2 (two) times a day., Disp: , Rfl:   •  losartan-hydrochlorothiazide (HYZAAR) 100-12.5 MG per tablet, Take 1 tablet by mouth Every Night., Disp: , Rfl:   •  montelukast (SINGULAIR) 10 MG tablet, Take 10 mg by mouth Every Morning., Disp: , Rfl:   •  multivitamin with minerals (Multivitamin Adults 50+) tablet tablet, Take 1 tablet by mouth Daily.,  "Disp: , Rfl:     Allergies:   Allergies   Allergen Reactions   • Bactrim [Sulfamethoxazole-Trimethoprim] Itching       Objective   Vital Signs:   Vitals:    05/07/21 1119   BP: 140/70   BP Location: Right arm   Patient Position: Sitting   Pulse: 68   Temp: 97.7 °F (36.5 °C)   SpO2: 98%   Weight: 81.6 kg (180 lb)   Height: 160 cm (63\")       PHYSICAL EXAM  General appearance: Awake, alert, cooperative  Head: Normocephalic, without obvious abnormality, atraumatic  Eyes: Conjunctivae/corneas clear, EOMs intact  Neck: no adenopathy, no carotid bruit, no JVD and thyroid: not enlarged  Lungs: clear to auscultation bilaterally and no rhonchi or crackles\", ' symmetric  Heart: regular rate and rhythm, S1, S2 normal, no murmur, click, rub or gallop  Abdomen: Soft, non-tender, bowel sounds normal,  no organomegaly  Extremities: extremities normal, atraumatic, no cyanosis or edema  Skin: Skin color, turgor normal, no rashes or lesions  Neurologic: Grossly normal     No results found for: GLUCOSE, CALCIUM, NA, K, CO2, CL, BUN, CREATININE, EGFRIFAFRI, EGFRIFNONA, BCR, ANIONGAP  No results found for: WBC, HGB, HCT, MCV, PLT  No results found for: INR, PROTIME  No results found for: TSH, T4ARZSH, L3JQJLC, THYROIDAB    Cardiac Testing:     I personally viewed and interpreted the patient's EKG/Telemetry/lab data    Procedures    Tobacco Cessation: N/A  Obstructive Sleep Apnea Screening: Completed    Assessment & Plan    Diagnoses and all orders for this visit:    1. PAF (paroxysmal atrial fibrillation) (CMS/HCC) (Primary)    2. Essential hypertension    3. Palpitations    4. Shortness of breath         Diagnosis Plan   1. PAF (paroxysmal atrial fibrillation) (CMS/HCC)   no recurrence of A. fib on the ambulatory loop monitor.  No palpitations.  Continue to monitor.  Home monitoring in situ.      This patient's Cardiac Implanted Electronic Device was manually interrogated and reprogrammed during the patient encounter today.  Iterative " programming changes were manually made to determine the sensing threshold, pacing threshold, lead impedance as well as underlying cardiac rhythm.  These programming changes were not limited to but included some or all of the following when appropriate: pacing mode, programmed AV delays, blanking periods, and refractory periods.  Data obtained as a result of these manual programing changes informed the patient's CIED permanent programming.   2. Essential hypertension   blood pressure well controlled today.   3. Palpitations   no real palpitations.   4. Shortness of breath   short of breath from Covid.  Covid in February.     Body mass index is 31.89 kg/m².    I spent 28 minutes in consultation with this patient which included more than 65% of this time in direct face-to-face counseling, physical examination and discussion of my assessment and findings and shared decision making with the patient.  The remainder of the time not spent face to face was performing one, some or all of the following actions:  preparing to see this patient ( eg. Review of tests),  ordering medications, tests or procedures ), care coordination, discussion of the plan with other healthcare providers, documenting clinical information in Epic well as independently interpreting results and communicating results to patient, family and or caregiver.  All time noted occurred on the date of service.    Follow Up:       Thank you for allowing me to participate in the care of your patient. Please to not hesitate to contact me with additional questions or concerns.      Jimenez Leone DO, FACC, RS  Cardiac Electrophysiologist  [facility]

## 2021-05-24 PROCEDURE — 93298 REM INTERROG DEV EVAL SCRMS: CPT | Performed by: INTERNAL MEDICINE

## 2021-05-24 PROCEDURE — G2066 INTER DEVC REMOTE 30D: HCPCS | Performed by: INTERNAL MEDICINE

## 2021-07-25 PROCEDURE — 93298 REM INTERROG DEV EVAL SCRMS: CPT | Performed by: INTERNAL MEDICINE

## 2021-07-25 PROCEDURE — G2066 INTER DEVC REMOTE 30D: HCPCS | Performed by: INTERNAL MEDICINE

## 2021-08-19 ENCOUNTER — TELEPHONE (OUTPATIENT)
Dept: CARDIOLOGY | Facility: CLINIC | Age: 71
End: 2021-08-19

## 2021-08-19 NOTE — TELEPHONE ENCOUNTER
Pt called states that her L leg and L foot is swollen. She denies redness or warmness at the site. She states that she had COVID back in Feb of this yr. I advised she schedule an appt with her PCP for further workup.

## 2021-08-25 PROCEDURE — 93298 REM INTERROG DEV EVAL SCRMS: CPT | Performed by: INTERNAL MEDICINE

## 2021-08-25 PROCEDURE — G2066 INTER DEVC REMOTE 30D: HCPCS | Performed by: INTERNAL MEDICINE

## 2021-09-25 PROCEDURE — G2066 INTER DEVC REMOTE 30D: HCPCS | Performed by: INTERNAL MEDICINE

## 2021-09-25 PROCEDURE — 93298 REM INTERROG DEV EVAL SCRMS: CPT | Performed by: INTERNAL MEDICINE

## 2021-10-26 PROCEDURE — G2066 INTER DEVC REMOTE 30D: HCPCS | Performed by: INTERNAL MEDICINE

## 2021-10-26 PROCEDURE — 93298 REM INTERROG DEV EVAL SCRMS: CPT | Performed by: INTERNAL MEDICINE

## 2021-11-26 PROCEDURE — G2066 INTER DEVC REMOTE 30D: HCPCS | Performed by: INTERNAL MEDICINE

## 2021-11-26 PROCEDURE — 93298 REM INTERROG DEV EVAL SCRMS: CPT | Performed by: INTERNAL MEDICINE

## 2022-01-27 PROCEDURE — 93298 REM INTERROG DEV EVAL SCRMS: CPT | Performed by: INTERNAL MEDICINE

## 2022-01-27 PROCEDURE — G2066 INTER DEVC REMOTE 30D: HCPCS | Performed by: INTERNAL MEDICINE

## 2022-02-27 PROCEDURE — 93298 REM INTERROG DEV EVAL SCRMS: CPT | Performed by: INTERNAL MEDICINE

## 2022-02-27 PROCEDURE — G2066 INTER DEVC REMOTE 30D: HCPCS | Performed by: INTERNAL MEDICINE

## 2022-03-30 PROCEDURE — G2066 INTER DEVC REMOTE 30D: HCPCS | Performed by: INTERNAL MEDICINE

## 2022-03-30 PROCEDURE — 93298 REM INTERROG DEV EVAL SCRMS: CPT | Performed by: INTERNAL MEDICINE

## 2022-04-30 PROCEDURE — G2066 INTER DEVC REMOTE 30D: HCPCS | Performed by: INTERNAL MEDICINE

## 2022-04-30 PROCEDURE — 93298 REM INTERROG DEV EVAL SCRMS: CPT | Performed by: INTERNAL MEDICINE

## 2022-05-24 ENCOUNTER — TELEPHONE (OUTPATIENT)
Dept: CARDIOLOGY | Facility: CLINIC | Age: 72
End: 2022-05-24

## 2022-05-24 NOTE — TELEPHONE ENCOUNTER
"Pt left voice mail message stating she wants to discontinue remote home monitoring of her loop recorder, states it's too expensive & she \"...doesn't believe it's working anyway.\"    I left pt a voice mail message to call the device clinic to discuss home monitoring.   "

## 2022-05-26 NOTE — TELEPHONE ENCOUNTER
Pt called to say she does want to discontinue monitoring. She states she cannot afford to be billed monthly.

## 2022-05-26 NOTE — TELEPHONE ENCOUNTER
MS Verduzco called back to say she spoke with her insurance company and they will pay for transmissions after she meets her deductible. She ask that we continue to monitor her linq.

## 2022-06-03 ENCOUNTER — OFFICE VISIT (OUTPATIENT)
Dept: CARDIOLOGY | Facility: CLINIC | Age: 72
End: 2022-06-03

## 2022-06-03 VITALS
BODY MASS INDEX: 30.12 KG/M2 | HEIGHT: 63 IN | SYSTOLIC BLOOD PRESSURE: 114 MMHG | DIASTOLIC BLOOD PRESSURE: 60 MMHG | WEIGHT: 170 LBS | HEART RATE: 61 BPM | OXYGEN SATURATION: 98 %

## 2022-06-03 DIAGNOSIS — I10 ESSENTIAL HYPERTENSION: ICD-10-CM

## 2022-06-03 DIAGNOSIS — I48.0 PAF (PAROXYSMAL ATRIAL FIBRILLATION): Primary | ICD-10-CM

## 2022-06-03 DIAGNOSIS — R00.2 PALPITATIONS: ICD-10-CM

## 2022-06-03 PROBLEM — Z86.16 HISTORY OF 2019 NOVEL CORONAVIRUS DISEASE (COVID-19): Status: ACTIVE | Noted: 2022-06-03

## 2022-06-03 PROCEDURE — 93291 INTERROG DEV EVAL SCRMS IP: CPT | Performed by: PHYSICIAN ASSISTANT

## 2022-06-03 PROCEDURE — 99212 OFFICE O/P EST SF 10 MIN: CPT | Performed by: PHYSICIAN ASSISTANT

## 2022-06-03 NOTE — PROGRESS NOTES
Encounter Date:06/03/2022      Patient ID: Geno Verduzco is a 71 y.o. female.    Pawel Brunner MD    Chief Complaint: Atrial Fibrillation      PROBLEM LIST:  Patient Active Problem List    Diagnosis Date Noted   • PAF (paroxysmal atrial fibrillation) ([facility]) 12/13/2016     Priority: High     Note Last Updated: 6/3/2022     A. Onset 07/04/09  B. Holter monitor,07/15/09 with 3% PACs, runs of atrial fibrillation, heart rate ; average 71 BPM  C. EKG, 08/18/09 with atrial fibrillation 99 BPM  D. Sotalol initiated 08/18/09  E. Sotalol discontinued due to side effects with initiation of Tambocor.  F. Stress Cardiolite, 09/15/09: Normal LV size and function, LVEF 70%, no ishemic changes  G. Rapid apparent SVT on Tambocor with subsequent discontinuation, 10/09  H. Loop recorder implantation, 2/10/2021 [employer]     • Palpitations 12/13/2016     Priority: Medium   • Essential hypertension 12/13/2016     Priority: Low   • History of 2019 novel coronavirus disease (COVID-19) 06/03/2022   • Polymyalgia ([facility]) 12/13/2016               History of Present Illness  Patient presents today for follow-up with a history of paroxysmal atrial fibrillation.  She had a loop recorder implantation February 2021.  She presents today for annual follow-up and device interrogation.  She has no current complaint exertional chest pain or dyspnea, she denies orthopnea, PND, claudication, lower extremity edema.  She has no awareness of tachyarrhythmias, she denies dizziness or syncope.  Her blood pressure at home typically runs about 120 mmHg systolic.  She remains compliant with her current medical regimen and reports no significant adverse side effects.    Allergies   Allergen Reactions   • Bactrim [Sulfamethoxazole-Trimethoprim] Itching       Current Outpatient Medications   Medication Instructions   • aspirin 81 mg, Oral, Daily   • dilTIAZem CD (CARDIZEM CD) 180 mg, Oral, 2 times daily   • losartan-hydrochlorothiazide (HYZAAR)  "100-12.5 MG per tablet 1 tablet, Oral, Nightly   • montelukast (SINGULAIR) 10 mg, Oral, Every Morning   • multivitamin with minerals tablet tablet 1 tablet, Oral, Daily   • vitamin D3 5,000 Units, Oral, 2 Times Daily       .    Objective:     /60 (BP Location: Left arm, Patient Position: Sitting)   Pulse 61   Ht 160 cm (63\")   Wt 77.1 kg (170 lb)   SpO2 98%   BMI 30.11 kg/m²    Body mass index is 30.11 kg/m².     Vitals reviewed.   Constitutional:       Appearance: Well-developed.   Pulmonary:      Effort: Pulmonary effort is normal. No respiratory distress.      Breath sounds: Normal breath sounds. No wheezing. No rales.      Comments: Bases clear  Chest:      Chest wall: Not tender to palpatation.   Cardiovascular:      Normal rate. Regular rhythm.      Murmurs: There is no murmur.      No gallop. No click. No rub.   Pulses:     Intact distal pulses.   Edema:     Peripheral edema absent.   Musculoskeletal: Normal range of motion.       Lab Review:                           Procedures               Assessment:      Diagnosis Plan   1. PAF (paroxysmal atrial fibrillation) (HCC)    This patient's Cardiac Implanted Electronic Device was manually interrogated and reprogrammed during the patient encounter today.  Iterative programming changes were manually made to determine the sensing threshold, pacing threshold, lead impedance as well as underlying cardiac rhythm.  These programming changes were not limited to but included some or all of the following when appropriate: pacing mode, programmed AV delays, blanking periods, and refractory periods.  Data obtained as a result of these manual programing changes informed the patient's CIED permanent programming.    No events, underlying rhythm sinus rhythm in the 50s battery voltage good   2. Essential hypertension   well managed on current medical regimen   3. Palpitations   no awareness of palpitations and no arrhythmias noted on loop recorder interrogation, " continue current medications     Plan:     Stable cardiac status.  Continue current medications.   in 1 year, sooner as needed.  Thank you for allowing us to participate in the care of your patient.     Electronically signed by ANDREW Cook, 06/03/22, 11:44 AM EDT.

## 2022-06-15 ENCOUNTER — TELEPHONE (OUTPATIENT)
Dept: CARDIOLOGY | Facility: CLINIC | Age: 72
End: 2022-06-15

## 2022-06-15 NOTE — TELEPHONE ENCOUNTER
Patient's BP's and HR have been elevated. Her BP's have been 157/85, 146/68, 147/85, 107/60, 126/85, 146/68 and 111/64.  Her HR has been ranging between 60-98. She states that her BP starts to get much higher in the afternoon and continues throughout the night. She has been taking one full tablet of Losartan-HCTZ 100-12.5 mg at night and 1/2 tablet in the morning. She would like to know if her medications can be adjusted to control her BP better?

## 2022-08-25 ENCOUNTER — OFFICE VISIT (OUTPATIENT)
Dept: CARDIOLOGY | Facility: CLINIC | Age: 72
End: 2022-08-25

## 2022-08-25 VITALS
DIASTOLIC BLOOD PRESSURE: 64 MMHG | SYSTOLIC BLOOD PRESSURE: 118 MMHG | HEIGHT: 63 IN | HEART RATE: 55 BPM | WEIGHT: 174 LBS | BODY MASS INDEX: 30.83 KG/M2

## 2022-08-25 DIAGNOSIS — R00.2 PALPITATIONS: ICD-10-CM

## 2022-08-25 DIAGNOSIS — M35.3 POLYMYALGIA: ICD-10-CM

## 2022-08-25 DIAGNOSIS — I10 ESSENTIAL HYPERTENSION: ICD-10-CM

## 2022-08-25 DIAGNOSIS — I48.0 PAF (PAROXYSMAL ATRIAL FIBRILLATION): Primary | ICD-10-CM

## 2022-08-25 DIAGNOSIS — I25.6 SILENT MYOCARDIAL ISCHEMIA: ICD-10-CM

## 2022-08-25 DIAGNOSIS — E88.81 METABOLIC SYNDROME: ICD-10-CM

## 2022-08-25 DIAGNOSIS — R01.1 MURMUR, CARDIAC: ICD-10-CM

## 2022-08-25 DIAGNOSIS — R00.1 BRADYCARDIA, SINUS: ICD-10-CM

## 2022-08-25 PROBLEM — E88.810 METABOLIC SYNDROME: Status: ACTIVE | Noted: 2022-08-25

## 2022-08-25 PROCEDURE — 93000 ELECTROCARDIOGRAM COMPLETE: CPT | Performed by: INTERNAL MEDICINE

## 2022-08-25 PROCEDURE — 99204 OFFICE O/P NEW MOD 45 MIN: CPT | Performed by: INTERNAL MEDICINE

## 2022-08-25 RX ORDER — NAPROXEN SODIUM 220 MG
220 TABLET ORAL 2 TIMES DAILY PRN
COMMUNITY

## 2022-08-25 RX ORDER — ESTRADIOL 0.1 MG/G
2 CREAM VAGINAL 2 TIMES WEEKLY
COMMUNITY

## 2022-08-25 NOTE — PROGRESS NOTES
Chief Complaint   Patient presents with   • Establish Care     Self referral, Dr Leone (follows her AFib) suggested she see us for BP management   • Hypertension     Was having problems with her systolic BP running in the 170's-180's. PCP changed Losartan 100/12.5 mg 1/2 tab daily to 50/12.5 mg 1 tab twice a day. BP is doing much better, 130's/ 70's now.   • Cardiac history     Diagnosed with afib about 15 years ago, was seen here in 2009 for testing only, loop recorder in 2021.  Dr Leone follows. Last Echo was in 2016. Stress in 2009( in chart) pt reports also having a regular stress at Dr Souza's office several years ago.    • Labs     PCP is faxing last labs from January        CARDIAC COMPLAINTS  Cardiac management in the patient with PAF and hypertension.      Subjective   Geno Verduzco is a 72 y.o. female came in today for her initial cardiac evaluation.  She has history of hypertension and history of PAF for which she has been followed by electrophysiologist many years ago.  She was seen here in 2016 when she started having palpitation again and underwent investigation which included an echocardiogram and a stress test.  It shows normal LV function and no evidence of ischemia.  She was seen again by Dr. Mancilla.  Since she has done fairly well with Cardizem it was decided to continue her on the same.  She has not been on any anticoagulant since she states is in sinus rhythm most of the time and she also has a loop recorder through which they monitor her monthly.  Her CHADS2 score is slightly elevated but since monitoring the arrhythmia Dr. Mancilla felt aspirin alone should be enough.  She has been having problem with the blood pressure recently.  She used to be on losartan 100/12 point 5/2 a tablet a day.  Her systolic blood pressure has been running around 180.  It is now increased to 50/12.5 twice a day after which the blood pressure is doing much better.  She denies having any chest pain or  headache with the  elevated blood pressure.  She denies having any chest pain, dizziness or loss of consciousness.  Her last lab work which was done in March showed her cholesterol was mildly elevated especially the LDL at 109.  Her blood sugar, renal function was normal.  She has no history of smoking or drinking alcohol.  Her mother  with heart disease she also had valvular heart problem.  Her father  with kidney failure.  One of her sister has valvular heart disease and her brother has history of DVT.    Past Surgical History:   Procedure Laterality Date   • CARDIOVASCULAR STRESS TEST  09/15/2009    9 Min. 10.2 METS 160/80. EF 70%. Negative   • CONVERTED (HISTORICAL) HOLTER  2009    AVG HR 71 BPM. PAF Seen   • ECHO - CONVERTED  2009    @[facility].- Normal   • ECHO - CONVERTED  12/15/2016    EF 55-60%, mild MR, RVSp 23 mmHg   • FOOT SURGERY Left        Current Outpatient Medications   Medication Sig Dispense Refill   • aspirin 81 MG EC tablet Take 81 mg by mouth Daily.     • Cholecalciferol (VITAMIN D3) 5000 UNITS tablet tablet Take 5,000 Units by mouth Daily.     • dilTIAZem CD (CARDIZEM CD) 180 MG 24 hr capsule Take 180 mg by mouth 2 (two) times a day.     • estradiol (ESTRACE) 0.1 MG/GM vaginal cream Insert 2 g into the vagina 2 (Two) Times a Week.     • losartan-HCTZ (HYZAAR) 50-12.5 combo dose Take  by mouth 2 (Two) Times a Day.     • montelukast (SINGULAIR) 10 MG tablet Take 10 mg by mouth Every Morning.     • multivitamin with minerals tablet tablet Take 1 tablet by mouth Daily.     • naproxen sodium (ALEVE) 220 MG tablet Take 220 mg by mouth 2 (Two) Times a Day As Needed.       No current facility-administered medications for this visit.           ALLERGIES:  Bactrim [sulfamethoxazole-trimethoprim]    Past Medical History:   Diagnosis Date   • Abnormal ECG    • Bursitis    • Hypertension    • PAF (paroxysmal atrial fibrillation) (HCC)    • Polymyalgia (HCC)        Social History  "    Tobacco Use   Smoking Status Never Smoker   Smokeless Tobacco Never Used          Family History   Problem Relation Age of Onset   • Heart disease Mother         PPM   • Hypertension Mother    • Stroke Mother    • Valvular heart disease Mother    • Kidney failure Father    • Valvular heart disease Sister    • Deep vein thrombosis Brother    • Cancer Maternal Grandmother    • Heart attack Maternal Grandfather    • Kidney failure Paternal Grandfather    • No Known Problems Daughter    • No Known Problems Son        Review of Systems   Constitutional: Negative for decreased appetite and malaise/fatigue.   HENT: Negative for congestion and sore throat.    Eyes: Negative for blurred vision.   Cardiovascular: Negative for chest pain and palpitations.   Respiratory: Negative for shortness of breath and snoring.    Endocrine: Negative for cold intolerance and heat intolerance.   Hematologic/Lymphatic: Negative for adenopathy. Does not bruise/bleed easily.   Skin: Negative for itching, nail changes and skin cancer.   Musculoskeletal: Negative for arthritis and myalgias.   Gastrointestinal: Negative for abdominal pain, dysphagia and heartburn.   Genitourinary: Negative for bladder incontinence and frequency.   Neurological: Negative for dizziness, light-headedness, seizures and vertigo.   Psychiatric/Behavioral: Negative for altered mental status.   Allergic/Immunologic: Negative for environmental allergies and hives.       Diabetes- No  Thyroid- normal    Objective     /64 (BP Location: Left arm)   Pulse 55   Ht 160 cm (63\")   Wt 78.9 kg (174 lb)   BMI 30.82 kg/m²     Vitals and nursing note reviewed.   Constitutional:       Appearance: Healthy appearance. Not in distress.   Eyes:      Conjunctiva/sclera: Conjunctivae normal.      Pupils: Pupils are equal, round, and reactive to light.   HENT:      Head: Normocephalic.   Pulmonary:      Effort: Pulmonary effort is normal.      Breath sounds: Normal breath " sounds.   Cardiovascular:      PMI at left midclavicular line. Bradycardia present. Regular rhythm.      Murmurs: There is a grade 3/6 high frequency blowing holosystolic murmur at the apex.   Abdominal:      General: Bowel sounds are normal.      Palpations: Abdomen is soft.   Musculoskeletal: Normal range of motion.      Cervical back: Normal range of motion and neck supple. Skin:     General: Skin is warm and dry.   Neurological:      Mental Status: Alert, oriented to person, place, and time and oriented to person, place and time.           ECG 12 Lead    Date/Time: 8/25/2022 12:45 PM  Performed by: Adrienne Voss MD  Authorized by: Adrienne Voss MD   Comparison: compared with previous ECG from 11/8/2019  Comparison to previous ECG: HR is low  Rhythm: sinus bradycardia  Rate: bradycardic  Q waves: V4 and V3    QRS axis: normal  Other findings: non-specific ST-T wave changes    Clinical impression: abnormal EKG              @ASSESSMENT/PLAN@  BMI is >= 30 and <35. (Class 1 Obesity). The following options were offered after discussion;: weight loss educational material (shared in after visit summary), exercise counseling/recommendations and nutrition counseling/recommendations     Diagnoses and all orders for this visit:    1. PAF (paroxysmal atrial fibrillation) (HCC) (Primary)  -     Treadmill Stress Test; Future  -     Adult Transthoracic Echo Complete W/ Cont if Necessary Per Protocol; Future  -     Lipid Panel; Future  -     TSH; Future    2. Essential hypertension  -     Comprehensive Metabolic Panel; Future  -     Lipid Panel; Future    3. Metabolic syndrome    4. Polymyalgia (HCC)  -     CBC & Differential; Future    5. Bradycardia, sinus    6. Murmur, cardiac  -     Adult Transthoracic Echo Complete W/ Cont if Necessary Per Protocol; Future    7. Palpitations  -     Treadmill Stress Test; Future  -     TSH; Future  -     Magnesium; Future    8. Silent myocardial ischemia  -     Treadmill  Stress Test; Future       At baseline she is bradycardic.  Her blood pressure is well controlled.  Her EKG shows sinus bradycardia, normal MA interval, Q waves in the anterior leads, nonspecific ST changes.  Her clinical examination reveals a BMI of 31.  Her cardiovascular examination is otherwise within normal limit other than 3/6 systolic murmur at the mitral area.    Regarding the PAF, she is maintaining sinus rhythm in fact sinus bradycardia.  She is just on aspirin without being on anticoagulant.  It is being managed with Cardizem CD and also the rhythm is monitored by the loop recorder.  Monitor was checked last week and found to have no A. fib at all.  We will continue the Cardizem CD.  I will advise her to recheck her TSH and magnesium level.  I also advised her to undergo an echocardiogram to evaluate the LV function, valvular structures, left atrial size and the PA pressure    Regarding her hypertension, she is on a combination of Hyzaar twice a day as well as Cardizem CD.  Blood pressure seems to be controlled.  I like to do a regular stress test to see whether her blood pressure is controlled during exertion also.  Meanwhile check her electrolytes and renal function.  If her blood pressure response is significantly elevated, will add low-dose of Aldactone and recheck her potassium soon.    Regarding her bradycardia, she is completely asymptomatic.  If during the stress test she has significantly blunted chronotropic response, then will discuss with the electrophysiologist about changing Cardizem CD to 1C antiarrhythmic medication    Regarding her metabolic syndrome and possible polymyalgia, talked to her about diet and weight reduction.  I gave her papers on Mediterranean diet.  She is also advised to have a blood count checked    Regarding the cardiac murmur, it appears to be secondary to mitral regurgitation.  I like to get an echocardiogram to look at the valvular structures especially the mitral  regurgitation and also to check the PA pressure.    Regarding her palpitation, she has not had any for almost 2 years.  We will continue to monitor it closely check a magnesium level also    I did talk to her about possibility of silent ischemia.  Continue aspirin and will review the stress test    Regarding advanced directive, she does have a living will and power of     Based on the results, further recommendations will be made.             Electronically signed by Adrienne Voss MD August 25, 2022 12:33 EDT

## 2022-09-01 ENCOUNTER — LAB (OUTPATIENT)
Dept: LAB | Facility: HOSPITAL | Age: 72
End: 2022-09-01

## 2022-09-01 DIAGNOSIS — I48.0 PAF (PAROXYSMAL ATRIAL FIBRILLATION): ICD-10-CM

## 2022-09-01 DIAGNOSIS — M35.3 POLYMYALGIA: ICD-10-CM

## 2022-09-01 DIAGNOSIS — R00.2 PALPITATIONS: ICD-10-CM

## 2022-09-01 DIAGNOSIS — I10 ESSENTIAL HYPERTENSION: ICD-10-CM

## 2022-09-01 LAB
ALBUMIN SERPL-MCNC: 4.39 G/DL (ref 3.5–5.2)
ALBUMIN/GLOB SERPL: 1.5 G/DL
ALP SERPL-CCNC: 161 U/L (ref 39–117)
ALT SERPL W P-5'-P-CCNC: 12 U/L (ref 1–33)
ANION GAP SERPL CALCULATED.3IONS-SCNC: 13.6 MMOL/L (ref 5–15)
AST SERPL-CCNC: 15 U/L (ref 1–32)
BASOPHILS # BLD AUTO: 0.02 10*3/MM3 (ref 0–0.2)
BASOPHILS NFR BLD AUTO: 0.3 % (ref 0–1.5)
BILIRUB SERPL-MCNC: 0.6 MG/DL (ref 0–1.2)
BUN SERPL-MCNC: 35 MG/DL (ref 8–23)
BUN/CREAT SERPL: 28.5 (ref 7–25)
CALCIUM SPEC-SCNC: 10.5 MG/DL (ref 8.6–10.5)
CHLORIDE SERPL-SCNC: 103 MMOL/L (ref 98–107)
CHOLEST SERPL-MCNC: 193 MG/DL (ref 0–200)
CO2 SERPL-SCNC: 23.4 MMOL/L (ref 22–29)
CREAT SERPL-MCNC: 1.23 MG/DL (ref 0.57–1)
DEPRECATED RDW RBC AUTO: 48.4 FL (ref 37–54)
EGFRCR SERPLBLD CKD-EPI 2021: 46.2 ML/MIN/1.73
EOSINOPHIL # BLD AUTO: 0.15 10*3/MM3 (ref 0–0.4)
EOSINOPHIL NFR BLD AUTO: 2.5 % (ref 0.3–6.2)
ERYTHROCYTE [DISTWIDTH] IN BLOOD BY AUTOMATED COUNT: 14.6 % (ref 12.3–15.4)
GLOBULIN UR ELPH-MCNC: 3 GM/DL
GLUCOSE SERPL-MCNC: 99 MG/DL (ref 65–99)
HCT VFR BLD AUTO: 47.7 % (ref 34–46.6)
HDLC SERPL-MCNC: 60 MG/DL (ref 40–60)
HGB BLD-MCNC: 15 G/DL (ref 12–15.9)
IMM GRANULOCYTES # BLD AUTO: 0.01 10*3/MM3 (ref 0–0.05)
IMM GRANULOCYTES NFR BLD AUTO: 0.2 % (ref 0–0.5)
LDLC SERPL CALC-MCNC: 120 MG/DL (ref 0–100)
LDLC/HDLC SERPL: 1.97 {RATIO}
LYMPHOCYTES # BLD AUTO: 1.57 10*3/MM3 (ref 0.7–3.1)
LYMPHOCYTES NFR BLD AUTO: 25.8 % (ref 19.6–45.3)
MAGNESIUM SERPL-MCNC: 2 MG/DL (ref 1.6–2.4)
MCH RBC QN AUTO: 28.2 PG (ref 26.6–33)
MCHC RBC AUTO-ENTMCNC: 31.4 G/DL (ref 31.5–35.7)
MCV RBC AUTO: 89.7 FL (ref 79–97)
MONOCYTES # BLD AUTO: 0.41 10*3/MM3 (ref 0.1–0.9)
MONOCYTES NFR BLD AUTO: 6.7 % (ref 5–12)
NEUTROPHILS NFR BLD AUTO: 3.92 10*3/MM3 (ref 1.7–7)
NEUTROPHILS NFR BLD AUTO: 64.5 % (ref 42.7–76)
NRBC BLD AUTO-RTO: 0 /100 WBC (ref 0–0.2)
PLATELET # BLD AUTO: 227 10*3/MM3 (ref 140–450)
PMV BLD AUTO: 11.8 FL (ref 6–12)
POTASSIUM SERPL-SCNC: 4.4 MMOL/L (ref 3.5–5.2)
PROT SERPL-MCNC: 7.4 G/DL (ref 6–8.5)
RBC # BLD AUTO: 5.32 10*6/MM3 (ref 3.77–5.28)
SODIUM SERPL-SCNC: 140 MMOL/L (ref 136–145)
TRIGL SERPL-MCNC: 73 MG/DL (ref 0–150)
TSH SERPL DL<=0.05 MIU/L-ACNC: 2.24 UIU/ML (ref 0.27–4.2)
VLDLC SERPL-MCNC: 13 MG/DL (ref 5–40)
WBC NRBC COR # BLD: 6.08 10*3/MM3 (ref 3.4–10.8)

## 2022-09-01 PROCEDURE — 80061 LIPID PANEL: CPT | Performed by: INTERNAL MEDICINE

## 2022-09-01 PROCEDURE — 93298 REM INTERROG DEV EVAL SCRMS: CPT | Performed by: INTERNAL MEDICINE

## 2022-09-01 PROCEDURE — 84443 ASSAY THYROID STIM HORMONE: CPT | Performed by: INTERNAL MEDICINE

## 2022-09-01 PROCEDURE — 80053 COMPREHEN METABOLIC PANEL: CPT | Performed by: INTERNAL MEDICINE

## 2022-09-01 PROCEDURE — 83735 ASSAY OF MAGNESIUM: CPT | Performed by: INTERNAL MEDICINE

## 2022-09-01 PROCEDURE — 85025 COMPLETE CBC W/AUTO DIFF WBC: CPT | Performed by: INTERNAL MEDICINE

## 2022-09-01 PROCEDURE — G2066 INTER DEVC REMOTE 30D: HCPCS | Performed by: INTERNAL MEDICINE

## 2022-09-07 ENCOUNTER — HOSPITAL ENCOUNTER (OUTPATIENT)
Dept: CARDIOLOGY | Facility: HOSPITAL | Age: 72
Discharge: HOME | End: 2022-09-07

## 2022-09-07 VITALS — BODY MASS INDEX: 30.82 KG/M2 | WEIGHT: 173.94 LBS | HEIGHT: 63 IN

## 2022-09-07 DIAGNOSIS — R01.1 MURMUR, CARDIAC: ICD-10-CM

## 2022-09-07 DIAGNOSIS — I25.6 SILENT MYOCARDIAL ISCHEMIA: ICD-10-CM

## 2022-09-07 DIAGNOSIS — R00.2 PALPITATIONS: ICD-10-CM

## 2022-09-07 DIAGNOSIS — I48.0 PAF (PAROXYSMAL ATRIAL FIBRILLATION): ICD-10-CM

## 2022-09-07 LAB
AORTIC DIMENSIONLESS INDEX: 0.89 (DI)
BH CV ECHO MEAS - ACS: 1.67 CM
BH CV ECHO MEAS - AO MAX PG: 7.6 MMHG
BH CV ECHO MEAS - AO MEAN PG: 3.5 MMHG
BH CV ECHO MEAS - AO ROOT DIAM: 2.7 CM
BH CV ECHO MEAS - AO V2 MAX: 138.1 CM/SEC
BH CV ECHO MEAS - AO V2 VTI: 30.6 CM
BH CV ECHO MEAS - EDV(CUBED): 59.2 ML
BH CV ECHO MEAS - EF_3D-VOL: 67 %
BH CV ECHO MEAS - ESV(CUBED): 23.5 ML
BH CV ECHO MEAS - FS: 26.5 %
BH CV ECHO MEAS - IVS/LVPW: 1.01 CM
BH CV ECHO MEAS - IVSD: 0.92 CM
BH CV ECHO MEAS - LA DIMENSION: 4.1 CM
BH CV ECHO MEAS - LAT PEAK E' VEL: 8.5 CM/SEC
BH CV ECHO MEAS - LV MASS(C)D: 106.9 GRAMS
BH CV ECHO MEAS - LV MAX PG: 6.1 MMHG
BH CV ECHO MEAS - LV MEAN PG: 2.7 MMHG
BH CV ECHO MEAS - LV V1 MAX: 123.6 CM/SEC
BH CV ECHO MEAS - LV V1 VTI: 27.4 CM
BH CV ECHO MEAS - LVIDD: 3.9 CM
BH CV ECHO MEAS - LVIDS: 2.9 CM
BH CV ECHO MEAS - LVPWD: 0.9 CM
BH CV ECHO MEAS - MED PEAK E' VEL: 6.9 CM/SEC
BH CV ECHO MEAS - MV A MAX VEL: 95.4 CM/SEC
BH CV ECHO MEAS - MV DEC SLOPE: 490.7 CM/SEC2
BH CV ECHO MEAS - MV DEC TIME: 0.28 MSEC
BH CV ECHO MEAS - MV E MAX VEL: 79.6 CM/SEC
BH CV ECHO MEAS - MV E/A: 0.83
BH CV ECHO MEAS - MV MAX PG: 5.6 MMHG
BH CV ECHO MEAS - MV MEAN PG: 1.53 MMHG
BH CV ECHO MEAS - MV P1/2T: 70.3 MSEC
BH CV ECHO MEAS - MV V2 VTI: 37.3 CM
BH CV ECHO MEAS - MVA(P1/2T): 3.1 CM2
BH CV ECHO MEAS - PA V2 MAX: 102.4 CM/SEC
BH CV ECHO MEAS - PI END-D VEL: 91.8 CM/SEC
BH CV ECHO MEAS - RAP SYSTOLE: 10 MMHG
BH CV ECHO MEAS - RV MAX PG: 2.6 MMHG
BH CV ECHO MEAS - RV V1 MAX: 80.5 CM/SEC
BH CV ECHO MEAS - RV V1 VTI: 20.9 CM
BH CV ECHO MEAS - RVDD: 3.1 CM
BH CV ECHO MEAS - RVSP: 22.9 MMHG
BH CV ECHO MEAS - TAPSE (>1.6): 3 CM
BH CV ECHO MEAS - TR MAX PG: 12.9 MMHG
BH CV ECHO MEAS - TR MAX VEL: 179.7 CM/SEC
BH CV ECHO MEASUREMENTS AVERAGE E/E' RATIO: 10.34
BH CV STRESS RECOVERY BP: NORMAL MMHG
BH CV STRESS RECOVERY HR: 59 BPM
BH CV XLRA - TDI S': 13.5 CM/SEC
IVRT: 70 MSEC
MAXIMAL PREDICTED HEART RATE: 146 BPM
MAXIMAL PREDICTED HEART RATE: 146 BPM
PERCENT MAX PREDICTED HR: 63.01 %
SINUS: 2.47 CM
STJ: 1.99 CM
STRESS BASELINE BP: NORMAL MMHG
STRESS BASELINE HR: 57 BPM
STRESS PERCENT HR: 74 %
STRESS POST ESTIMATED WORKLOAD: 7 METS
STRESS POST EXERCISE DUR MIN: 4 MIN
STRESS POST PEAK BP: NORMAL MMHG
STRESS POST PEAK HR: 92 BPM
STRESS TARGET HR: 124 BPM
STRESS TARGET HR: 124 BPM

## 2022-09-07 PROCEDURE — 93306 TTE W/DOPPLER COMPLETE: CPT

## 2022-09-07 PROCEDURE — 93018 CV STRESS TEST I&R ONLY: CPT | Performed by: INTERNAL MEDICINE

## 2022-09-07 PROCEDURE — 93017 CV STRESS TEST TRACING ONLY: CPT

## 2022-09-07 PROCEDURE — 93306 TTE W/DOPPLER COMPLETE: CPT | Performed by: INTERNAL MEDICINE

## 2022-09-08 ENCOUNTER — TELEPHONE (OUTPATIENT)
Dept: CARDIOLOGY | Facility: CLINIC | Age: 72
End: 2022-09-08

## 2022-09-08 RX ORDER — MINOXIDIL 2.5 MG/1
2.5 TABLET ORAL DAILY
Qty: 90 TABLET | Refills: 3 | Status: SHIPPED | OUTPATIENT
Start: 2022-09-08 | End: 2023-03-02 | Stop reason: DRUGHIGH

## 2022-09-08 NOTE — TELEPHONE ENCOUNTER
Patient aware of stress test results and recommendations to add minoxidil 2.5 mg daily.  Script sent to [facility] pharmacy.

## 2022-10-02 PROCEDURE — 93298 REM INTERROG DEV EVAL SCRMS: CPT | Performed by: INTERNAL MEDICINE

## 2022-10-02 PROCEDURE — G2066 INTER DEVC REMOTE 30D: HCPCS | Performed by: INTERNAL MEDICINE

## 2022-11-02 PROCEDURE — G2066 INTER DEVC REMOTE 30D: HCPCS | Performed by: INTERNAL MEDICINE

## 2022-11-02 PROCEDURE — 93298 REM INTERROG DEV EVAL SCRMS: CPT | Performed by: INTERNAL MEDICINE

## 2022-12-03 PROCEDURE — G2066 INTER DEVC REMOTE 30D: HCPCS | Performed by: INTERNAL MEDICINE

## 2022-12-03 PROCEDURE — 93298 REM INTERROG DEV EVAL SCRMS: CPT | Performed by: INTERNAL MEDICINE

## 2023-01-03 PROCEDURE — 93298 REM INTERROG DEV EVAL SCRMS: CPT | Performed by: INTERNAL MEDICINE

## 2023-01-03 PROCEDURE — G2066 INTER DEVC REMOTE 30D: HCPCS | Performed by: INTERNAL MEDICINE

## 2023-02-21 ENCOUNTER — TELEPHONE (OUTPATIENT)
Dept: CARDIOLOGY | Facility: CLINIC | Age: 73
End: 2023-02-21
Payer: MEDICARE

## 2023-02-21 DIAGNOSIS — M79.601 PAIN IN BOTH UPPER EXTREMITIES: ICD-10-CM

## 2023-02-21 DIAGNOSIS — I48.0 PAF (PAROXYSMAL ATRIAL FIBRILLATION): Primary | ICD-10-CM

## 2023-02-21 DIAGNOSIS — M79.602 PAIN IN BOTH UPPER EXTREMITIES: ICD-10-CM

## 2023-02-21 DIAGNOSIS — R53.82 CHRONIC FATIGUE: ICD-10-CM

## 2023-02-21 DIAGNOSIS — I73.1 THROMBOANGIITIS OBLITERANS (BUERGER'S DISEASE): ICD-10-CM

## 2023-02-21 DIAGNOSIS — M35.3 POLYMYALGIA: ICD-10-CM

## 2023-02-21 DIAGNOSIS — E55.9 VITAMIN D DEFICIENCY: ICD-10-CM

## 2023-02-21 DIAGNOSIS — I10 ESSENTIAL HYPERTENSION: ICD-10-CM

## 2023-02-21 NOTE — TELEPHONE ENCOUNTER
Caller: Geno Verduzco     Relationship: SELF    Best call back number: [phone]    What is your medical concern? PT STATES THAT HER RIGHT ARM AND HAND ARE EXTREMELY COLD ALL THE TIME. SHE STATES THE TOP OF HER RIGHT HAND IS NUMB. SHE STATES THAT THIS IS KEEPING HER UP AT NIGHT AND THAT IS HAVING TO WEAR A GLOVE TO KEEP THE HAND WARM.    How long has this issue been going on? ABOUT 3 WEEKS    Is your provider already aware of this issue? NO    Have you been treated for this issue? NO

## 2023-02-21 NOTE — TELEPHONE ENCOUNTER
Spoke w/ pt. Reports new onset numbness in right hand with occasional pain and tingling. Symptoms started about 3 weeks ago. States she does have pain in her shoulders r/t fibromyalgia. Has never been diagnosed w/ arthritis. Reports bp and hr are always normal. At times, her systolic bp will get up to lower 40s but is seldom. States brother has blockages in his veins. Expresses concern this is something hereditary.  She has an appt w/ her PCP next week but did not want to wait.

## 2023-03-01 NOTE — PROGRESS NOTES
Chief Complaint   Patient presents with   • Follow-up     Pt is here for cardiac follow up.  Pt states she is having testing on her right arm on the 20th.  She reports that is is numb and cold feeling.  She states she is SOB with walking at this time.  She denies CP, dizziness or palpitations.  Pt does take a daily ASA.      • Med Refill     Pt request  90 day refills to be sent to [facility] pharmacy.  Medications were verified by the pt.     • Lab Work     Pt states their last labs were with her PCP.       Cardiac Complaints  dyspnea      Subjective   Geno Verduzco is a 72 y.o. female with HTN and PAF. She had been followed by EP for many years, who instructed ASA alone as her rhythm had been well managed. She was seen initially here in 2016 when she was having palpitations. At that time, she underwent stress and echo. Stress showed no ischemia and good LV function. She was referred back to the office in August 2022 for elevated BP. Regular stress was inconclusive as she failed to reach target HR, but significant HTN response noted, minoxidil added at 2.5mg daily.  She then called in Feb 2023 with right hand pain and tingling, Doppler US recommended as well as labs.     She comes today for follow up and admits she is having a scan to her right arm on the 20th of the month. She admits arm is still cold and numb, but she denies any edema/redness. She does admit to SOA with exertion and is very concerned about possibility of clotting, as this runs in her family.  Labs checked with PCP, checked a few months ago, no current available. Refills requested.         Cardiac History  Past Surgical History:   Procedure Laterality Date   • CARDIOVASCULAR STRESS TEST  09/15/2009    9 Min. 10.2 METS 160/80. EF 70%. Negative   • CARDIOVASCULAR STRESS TEST  09/07/2022    R.Stress- 4 Min.7.0 METS.63% THR.215/101. Inconclusive   • CONVERTED (HISTORICAL) HOLTER  07/17/2009    AVG HR 71 BPM. PAF Seen   • ECHO - CONVERTED  07/03/2009     @[facility].- Normal   • ECHO - CONVERTED  12/15/2016    EF 55-60%, mild MR, RVSp 23 mmHg   • ECHO - CONVERTED  09/07/2022    EF 65%. LA- 4.1. Trace-Mild MR. RVSP- 23 mmHg   • FOOT SURGERY Left        Current Outpatient Medications   Medication Sig Dispense Refill   • aspirin 81 MG EC tablet Take 1 tablet by mouth Daily.     • Cholecalciferol (VITAMIN D3) 5000 UNITS tablet tablet Take 1 tablet by mouth Daily.     • dilTIAZem CD (CARDIZEM CD) 180 MG 24 hr capsule Take 1 capsule by mouth 2 (two) times a day.     • estradiol (ESTRACE) 0.1 MG/GM vaginal cream Insert 2 g into the vagina 2 (Two) Times a Week.     • losartan-HCTZ (HYZAAR) 50-12.5 combo dose Take  by mouth 2 (Two) Times a Day.     • montelukast (SINGULAIR) 10 MG tablet Take 1 tablet by mouth Every Morning.     • multivitamin with minerals tablet tablet Take 1 tablet by mouth Daily.     • naproxen sodium (ALEVE) 220 MG tablet Take 1 tablet by mouth 2 (Two) Times a Day As Needed.     • minoxidil (LONITEN) 2.5 MG tablet Take 1 tablet by mouth 2 (Two) Times a Day. 60 tablet 8     No current facility-administered medications for this visit.       Bactrim [sulfamethoxazole-trimethoprim]    Past Medical History:   Diagnosis Date   • Abnormal ECG    • Bursitis    • Hypertension    • PAF (paroxysmal atrial fibrillation) (HCC)    • Polymyalgia (HCC)        Social History     Socioeconomic History   • Marital status:    Tobacco Use   • Smoking status: Never   • Smokeless tobacco: Never   Vaping Use   • Vaping Use: Never used   Substance and Sexual Activity   • Alcohol use: No   • Drug use: No   • Sexual activity: Defer       Family History   Problem Relation Age of Onset   • Heart disease Mother         PPM   • Hypertension Mother    • Stroke Mother    • Valvular heart disease Mother    • Kidney failure Father    • Valvular heart disease Sister    • Deep vein thrombosis Brother    • Cancer Maternal Grandmother    • Heart attack Maternal Grandfather    •  "Kidney failure Paternal Grandfather    • No Known Problems Daughter    • No Known Problems Son        Review of Systems   Constitutional: Negative for malaise/fatigue and night sweats.   Cardiovascular: Positive for dyspnea on exertion. Negative for chest pain, claudication, irregular heartbeat, leg swelling, near-syncope, orthopnea, palpitations and syncope.   Respiratory: Positive for shortness of breath. Negative for cough and wheezing.    Musculoskeletal: Negative for back pain, joint pain and stiffness.   Gastrointestinal: Negative for anorexia, heartburn, nausea and vomiting.   Genitourinary: Negative for dysuria, hematuria, hesitancy and nocturia.   Neurological: Negative for dizziness, light-headedness and loss of balance.   Psychiatric/Behavioral: Negative for depression and memory loss. The patient is not nervous/anxious.            Objective     /74 (BP Location: Left arm, Patient Position: Sitting)   Pulse 57   Ht 157.5 cm (62\")   Wt 80.7 kg (178 lb)   BMI 32.56 kg/m²     Constitutional:       Appearance: Not in distress.   Eyes:      Pupils: Pupils are equal, round, and reactive to light.   HENT:      Nose: Nose normal.   Pulmonary:      Effort: Pulmonary effort is normal.      Breath sounds: Normal breath sounds.   Cardiovascular:      PMI at left midclavicular line. Bradycardia present. Regular rhythm.      Murmurs: There is a systolic murmur.   Abdominal:      Palpations: Abdomen is soft.   Musculoskeletal: Normal range of motion.      Cervical back: Normal range of motion and neck supple. Skin:     General: Skin is warm and dry.   Neurological:      Mental Status: Alert.           ECG 12 Lead    Date/Time: 3/2/2023 10:26 AM  Performed by: Kristy Mario APRN  Authorized by: Kristy Mario APRN   Comparison: compared with previous ECG from 8/25/2022  Similar to previous ECG  Rhythm: sinus bradycardia  BPM: 57    Clinical impression: abnormal EKG  Comments: Normal QT, possible anterior " infarct                 Diagnoses and all orders for this visit:    1. Shortness of breath (Primary)  -     D-dimer, Quantitative; Future  -     XR Chest PA & Lateral; Future    2. PAF (paroxysmal atrial fibrillation) (HCC)  -     ECG 12 Lead    3. Bradycardia, sinus    4. Essential hypertension    5. Obesity (BMI 30.0-34.9)    Other orders  -     minoxidil (LONITEN) 2.5 MG tablet; Take 1 tablet by mouth 2 (Two) Times a Day.  Dispense: 60 tablet; Refill: 8             PAF: EKG done today showed a sinus corby with normal QT, similar to prior. She has remained on ASA alone per EP instructions as she has maintained NSR for many years. Will continue with cardizem therapy for rate control. Limited caffeine urged.    HTN: BP is mildly elevated today. She admits it has been in the high 140's at home SBP. Patient will be urged to increase her minoxidil to 2.5mg BID. She will continue same hyzaar therapy.     SOA: Will check chest xray and D dimer for any acute pulmonary issues/and to assess clotting factors as she has fears about possible clotting.    Right arm numbness/tingling: Upcoming scan March 20th at [facility] to assess for concerns.     Cardiac status: Appears stable. Most recent stress showed no ischemia per EKG but she failed to reach target HR. We did discuss repeating stress as nuclear since SOA with exertion is noted, she declines at present. ASA continued.    Refills per request.    BMI noted at 32.56, good cardiac diet with limited carbs, calories, and walking regimen encouraged.     6 month follow up advised, or sooner if needed.         Problems Addressed this Visit        Cardiac and Vasculature    PAF (paroxysmal atrial fibrillation) (HCC)    Relevant Orders    ECG 12 Lead    Essential hypertension    Relevant Medications    minoxidil (LONITEN) 2.5 MG tablet    Bradycardia, sinus   Other Visit Diagnoses     Shortness of breath    -  Primary    Relevant Orders    D-dimer, Quantitative    XR Chest PA &  Lateral    Obesity (BMI 30.0-34.9)          Diagnoses       Codes Comments    Shortness of breath    -  Primary ICD-10-CM: R06.02  ICD-9-CM: 786.05     PAF (paroxysmal atrial fibrillation) (HCC)     ICD-10-CM: I48.0  ICD-9-CM: 427.31     Bradycardia, sinus     ICD-10-CM: R00.1  ICD-9-CM: 427.89     Essential hypertension     ICD-10-CM: I10  ICD-9-CM: 401.9     Obesity (BMI 30.0-34.9)     ICD-10-CM: E66.9  ICD-9-CM: 278.00                 Electronically signed by Kristy Mario, APRN March 2, 2023 11:27 EST

## 2023-03-02 ENCOUNTER — OFFICE VISIT (OUTPATIENT)
Dept: CARDIOLOGY | Facility: CLINIC | Age: 73
End: 2023-03-02
Payer: MEDICARE

## 2023-03-02 VITALS
HEART RATE: 57 BPM | DIASTOLIC BLOOD PRESSURE: 74 MMHG | SYSTOLIC BLOOD PRESSURE: 142 MMHG | WEIGHT: 178 LBS | BODY MASS INDEX: 32.76 KG/M2 | HEIGHT: 62 IN

## 2023-03-02 DIAGNOSIS — R06.02 SHORTNESS OF BREATH: Primary | ICD-10-CM

## 2023-03-02 DIAGNOSIS — I10 ESSENTIAL HYPERTENSION: ICD-10-CM

## 2023-03-02 DIAGNOSIS — I48.0 PAF (PAROXYSMAL ATRIAL FIBRILLATION): ICD-10-CM

## 2023-03-02 DIAGNOSIS — R00.1 BRADYCARDIA, SINUS: ICD-10-CM

## 2023-03-02 DIAGNOSIS — E66.9 OBESITY (BMI 30.0-34.9): ICD-10-CM

## 2023-03-02 PROCEDURE — 93000 ELECTROCARDIOGRAM COMPLETE: CPT | Performed by: NURSE PRACTITIONER

## 2023-03-02 PROCEDURE — 99214 OFFICE O/P EST MOD 30 MIN: CPT | Performed by: NURSE PRACTITIONER

## 2023-03-02 RX ORDER — MINOXIDIL 2.5 MG/1
2.5 TABLET ORAL 2 TIMES DAILY
Qty: 60 TABLET | Refills: 8 | Status: SHIPPED | OUTPATIENT
Start: 2023-03-02

## 2023-03-06 PROCEDURE — G2066 INTER DEVC REMOTE 30D: HCPCS | Performed by: INTERNAL MEDICINE

## 2023-03-06 PROCEDURE — 93298 REM INTERROG DEV EVAL SCRMS: CPT | Performed by: INTERNAL MEDICINE

## 2023-03-07 ENCOUNTER — TELEPHONE (OUTPATIENT)
Dept: CARDIOLOGY | Facility: CLINIC | Age: 73
End: 2023-03-07
Payer: MEDICARE

## 2023-03-07 DIAGNOSIS — R79.89 POSITIVE D DIMER: Primary | ICD-10-CM

## 2023-03-07 DIAGNOSIS — R06.02 SHORTNESS OF BREATH: ICD-10-CM

## 2023-03-08 NOTE — TELEPHONE ENCOUNTER
Pt made aware of lab result, voiced understanding.  Will scheduled CT and let pt know of time and place.

## 2023-03-13 ENCOUNTER — TELEPHONE (OUTPATIENT)
Dept: CARDIOLOGY | Facility: CLINIC | Age: 73
End: 2023-03-13
Payer: MEDICARE

## 2023-03-13 NOTE — TELEPHONE ENCOUNTER
Spoke with patient concerning chest xray in over due basket. Patient reports she had chest xray at [facility]. Phoned [facility], left message for results of chest xray.

## 2023-04-06 PROCEDURE — 93298 REM INTERROG DEV EVAL SCRMS: CPT | Performed by: INTERNAL MEDICINE

## 2023-04-06 PROCEDURE — G2066 INTER DEVC REMOTE 30D: HCPCS | Performed by: INTERNAL MEDICINE

## 2023-05-07 PROCEDURE — G2066 INTER DEVC REMOTE 30D: HCPCS | Performed by: INTERNAL MEDICINE

## 2023-05-07 PROCEDURE — 93298 REM INTERROG DEV EVAL SCRMS: CPT | Performed by: INTERNAL MEDICINE

## 2023-06-05 ENCOUNTER — TELEPHONE (OUTPATIENT)
Dept: CARDIOLOGY | Facility: CLINIC | Age: 73
End: 2023-06-05
Payer: MEDICARE

## 2023-06-05 NOTE — TELEPHONE ENCOUNTER
Caller: Geno Verduzco    Relationship to patient: Self    Best call back number: [phone]    Patient is needing: AN APPT DUE TO HER LEFT SIDE SWELLING BP HAS BEEN RUNNING EXTREMELY HIGH. PT IS TRAVELING TO [place] AND WOULD LIKE TO BE SEEN WHEN SHE RETURNS. THANK YOU

## 2023-06-05 NOTE — TELEPHONE ENCOUNTER
Pt is having high BP that goes into the 165's systolic. Pt is not having any pain or any other symptoms aside from BP and swelling to left side. Has been active on her trip and PCP had changed up her medication. She is only taking her Losartan-HCTZ once instead of twice a day and pt PCP said she could take an extra 12.5 HCTZ if needed but she has not yet taken another HCTZ dose.

## 2023-06-23 PROBLEM — Z95.818 IMPLANTABLE LOOP RECORDER PRESENT: Status: ACTIVE | Noted: 2023-06-23

## 2023-06-23 PROBLEM — R00.2 PALPITATIONS: Status: ACTIVE | Noted: 2023-06-23

## 2023-08-08 PROCEDURE — 93298 REM INTERROG DEV EVAL SCRMS: CPT | Performed by: INTERNAL MEDICINE

## 2023-08-08 PROCEDURE — G2066 INTER DEVC REMOTE 30D: HCPCS | Performed by: INTERNAL MEDICINE

## 2023-08-31 ENCOUNTER — TELEPHONE (OUTPATIENT)
Dept: CARDIOLOGY | Facility: CLINIC | Age: 73
End: 2023-08-31
Payer: MEDICARE

## 2023-08-31 NOTE — TELEPHONE ENCOUNTER
Caller: Geno Verduzco    Relationship: Self    Best call back number: [phone]    What is the best time to reach you: ANY    Who are you requesting to speak with (clinical staff, provider,  specific staff member): CLINICAL      What was the call regarding: PATIENT STATES SHE IS HAVING SURGERY ON HER EYES, AND THE DOCTOR IS REQUESTING THAT SHE DISCONTINUE ASPIRIN UNTIL AFTER THE SURGERY, WHICH IS ON 09-19-23. PLEASE CONTACT PATIENT AND ADVISE HER ON HOW TO PROCEED.     Is it okay if the provider responds through [facility]: PHONE CALL PLEASE.

## 2023-09-01 NOTE — TELEPHONE ENCOUNTER
Faxed cardiac clearance request form to Dr. Adams at [facility] at [phone].

## 2023-09-05 ENCOUNTER — TELEPHONE (OUTPATIENT)
Dept: CARDIOLOGY | Facility: CLINIC | Age: 73
End: 2023-09-05
Payer: MEDICARE

## 2023-09-05 NOTE — TELEPHONE ENCOUNTER
Fax - [phone]    Dr Requesting- Dr Garrett Yañez    Procedure- Bilateral upper lid blepharoplasty     Date of Procedure- 9/13/23    Testing-  Echo- 9/7/22 - EF 65%. LA- 4.1. Trace-Mild MR. RVSP- 23 mmHg   Stress- 9/7/22 - R.Stress- 4 Min.7.0 METS.63% THR.215/101. Inconclusive     Meds-   ASA 81

## 2023-09-08 ENCOUNTER — TELEPHONE (OUTPATIENT)
Dept: CARDIOLOGY | Facility: CLINIC | Age: 73
End: 2023-09-08
Payer: MEDICARE

## 2023-09-08 PROCEDURE — 93298 REM INTERROG DEV EVAL SCRMS: CPT | Performed by: INTERNAL MEDICINE

## 2023-09-08 PROCEDURE — G2066 INTER DEVC REMOTE 30D: HCPCS | Performed by: INTERNAL MEDICINE

## 2023-09-08 NOTE — TELEPHONE ENCOUNTER
Refaxed her cardiac clearance letter to Dr Yañez to his nurse at her requested number.  [phone]

## 2024-01-09 ENCOUNTER — TELEPHONE (OUTPATIENT)
Dept: CARDIOLOGY | Facility: CLINIC | Age: 74
End: 2024-01-09
Payer: MEDICARE

## 2024-01-09 NOTE — TELEPHONE ENCOUNTER
Mrs Verduzco called wanting to discontinue loop recorder monitor readings due to insurance no longer paying.  She is going to get in office checks only.  Her loop will be in 3 yrs next month.  She reports insurance will only pay for 3 yrs of monitoring.

## 2024-01-24 ENCOUNTER — OFFICE VISIT (OUTPATIENT)
Dept: CARDIOLOGY | Facility: CLINIC | Age: 74
End: 2024-01-24
Payer: MEDICARE

## 2024-01-24 VITALS
HEART RATE: 64 BPM | WEIGHT: 184.8 LBS | HEIGHT: 63 IN | DIASTOLIC BLOOD PRESSURE: 56 MMHG | SYSTOLIC BLOOD PRESSURE: 112 MMHG | BODY MASS INDEX: 32.74 KG/M2

## 2024-01-24 DIAGNOSIS — I48.0 PAF (PAROXYSMAL ATRIAL FIBRILLATION): Primary | ICD-10-CM

## 2024-01-24 DIAGNOSIS — R00.2 PALPITATIONS: ICD-10-CM

## 2024-01-24 DIAGNOSIS — E66.9 OBESITY (BMI 30.0-34.9): ICD-10-CM

## 2024-01-24 DIAGNOSIS — I10 ESSENTIAL HYPERTENSION: ICD-10-CM

## 2024-01-24 PROCEDURE — 93000 ELECTROCARDIOGRAM COMPLETE: CPT | Performed by: NURSE PRACTITIONER

## 2024-01-24 PROCEDURE — 3078F DIAST BP <80 MM HG: CPT | Performed by: NURSE PRACTITIONER

## 2024-01-24 PROCEDURE — 99213 OFFICE O/P EST LOW 20 MIN: CPT | Performed by: NURSE PRACTITIONER

## 2024-01-24 PROCEDURE — 3074F SYST BP LT 130 MM HG: CPT | Performed by: NURSE PRACTITIONER

## 2024-01-24 RX ORDER — HYDROCHLOROTHIAZIDE 12.5 MG/1
12.5 TABLET ORAL EVERY MORNING
Qty: 90 TABLET | Refills: 2 | Status: SHIPPED | OUTPATIENT
Start: 2024-01-24

## 2024-01-24 RX ORDER — MINOXIDIL 2.5 MG/1
2.5 TABLET ORAL 2 TIMES DAILY
Qty: 90 TABLET | Refills: 2 | Status: SHIPPED | OUTPATIENT
Start: 2024-01-24

## 2024-01-24 RX ORDER — DILTIAZEM HYDROCHLORIDE 180 MG/1
180 CAPSULE, COATED, EXTENDED RELEASE ORAL DAILY
Qty: 90 CAPSULE | Refills: 2 | Status: SHIPPED | OUTPATIENT
Start: 2024-01-24

## 2024-01-24 RX ORDER — LOSARTAN POTASSIUM 50 MG/1
50 TABLET ORAL DAILY
Qty: 90 TABLET | Refills: 2 | Status: SHIPPED | OUTPATIENT
Start: 2024-01-24

## 2024-01-24 NOTE — PROGRESS NOTES
Chief Complaint   Patient presents with    Follow-up     Pt is here for cardiac follow up.  Pt denies CP, SOB, dizziness or palpitations.  She reports a lot of cramps in her legs and feet.  Pt does take a daily ASA.      Med Refill     Pt request 90 day refills to be sent to [facility].  Medications were verified by the pt.      Lab Work     Pt states their last labs were  with her PCP.         Cardiac Complaints  none      Subjective   Geno Verduzco is a 73 y.o. female with HTN, palpitations, and PAF. She had been followed by EP for many years, who instructed ASA alone as her rhythm had been well managed. She was seen initially here in 2016 when she was having palpitations. At that time, she underwent stress and echo. Stress showed no ischemia and good LV function. She was referred back to the office in August 2022 for elevated BP. Regular stress was inconclusive as she failed to reach target HR, but significant HTN response noted, minoxidil added at 2.5mg daily.  She then called in Feb 2023 with right hand pain and tingling, Doppler US recommended as well as labs. Negative for concerns. Elevated d dimer noted, CTA of chest negative for PE.    She comes today for follow up and denies any new concerns. No CP, SOA, palpitations, dizziness, or syncope noted. Muscle cramps noted, but improved with supplements. Labs with PCP, she is unsure when checked. Refills requested.          Cardiac History  Past Surgical History:   Procedure Laterality Date    CARDIOVASCULAR STRESS TEST  09/15/2009    9 Min. 10.2 METS 160/80. EF 70%. Negative    CARDIOVASCULAR STRESS TEST  09/07/2022    R.Stress- 4 Min.7.0 METS.63% THR.215/101. Inconclusive    CONVERTED (HISTORICAL) HOLTER  07/17/2009    AVG HR 71 BPM. PAF Seen    ECHO - CONVERTED  07/03/2009    @[facility].- Normal    ECHO - CONVERTED  12/15/2016    EF 55-60%, mild MR, RVSp 23 mmHg    ECHO - CONVERTED  09/07/2022    EF 65%. LA- 4.1. Trace-Mild MR. RVSP- 23 mmHg     EYE SURGERY  2023    lid    FOOT SURGERY Left        Current Outpatient Medications   Medication Sig Dispense Refill    aspirin 81 MG EC tablet Take 1 tablet by mouth Daily.      Cholecalciferol (VITAMIN D3) 5000 UNITS tablet tablet Take 1 tablet by mouth Daily.      dilTIAZem CD (CARDIZEM CD) 180 MG 24 hr capsule Take 1 capsule by mouth Daily. 90 capsule 2    estradiol (ESTRACE) 0.1 MG/GM vaginal cream Insert 2 applicators into the vagina 2 (Two) Times a Week.      hydroCHLOROthiazide (HYDRODIURIL) 12.5 MG tablet Take 1 tablet by mouth Every Morning. 90 tablet 2    losartan (COZAAR) 50 MG tablet Take 1 tablet by mouth Daily. 90 tablet 2    minoxidil (LONITEN) 2.5 MG tablet Take 1 tablet by mouth 2 (Two) Times a Day. 90 tablet 2    montelukast (SINGULAIR) 10 MG tablet Take 1 tablet by mouth Every Morning.      multivitamin with minerals tablet tablet Take 1 tablet by mouth Daily.      naproxen sodium (ALEVE) 220 MG tablet Take 1 tablet by mouth 2 (Two) Times a Day As Needed.      POTASSIUM PO Take 1 tablet by mouth Daily.       No current facility-administered medications for this visit.       Bactrim [sulfamethoxazole-trimethoprim]    Past Medical History:   Diagnosis Date    Bursitis     PAF (paroxysmal atrial fibrillation)     Polymyalgia        Social History     Socioeconomic History    Marital status:    Tobacco Use    Smoking status: Never    Smokeless tobacco: Never   Vaping Use    Vaping Use: Never used   Substance and Sexual Activity    Alcohol use: No    Drug use: No    Sexual activity: Defer       Family History   Problem Relation Age of Onset    Heart disease Mother         PPM    Hypertension Mother     Stroke Mother     Valvular heart disease Mother     Kidney failure Father     Valvular heart disease Sister     Deep vein thrombosis Brother     Cancer Maternal Grandmother     Heart attack Maternal Grandfather     Kidney failure Paternal Grandfather     No Known Problems Daughter     No Known  "Problems Son        Review of Systems   Constitutional: Negative for malaise/fatigue and night sweats.   Cardiovascular:  Negative for chest pain, claudication, dyspnea on exertion, irregular heartbeat, leg swelling, orthopnea, palpitations and syncope.   Respiratory:  Negative for cough, shortness of breath and wheezing.    Musculoskeletal:  Positive for muscle cramps and stiffness.   Gastrointestinal:  Negative for anorexia, heartburn, nausea and vomiting.   Genitourinary:  Negative for dysuria, hematuria, hesitancy and nocturia.   Neurological:  Negative for dizziness, headaches and light-headedness.   Psychiatric/Behavioral:  Negative for depression and memory loss. The patient is not nervous/anxious.            Objective     /56 (BP Location: Left arm, Patient Position: Sitting)   Pulse 64   Ht 160 cm (63\")   Wt 83.8 kg (184 lb 12.8 oz)   BMI 32.74 kg/m²     Constitutional:       Appearance: Not in distress.   Eyes:      Pupils: Pupils are equal, round, and reactive to light.   HENT:      Nose: Nose normal.   Pulmonary:      Effort: Pulmonary effort is normal.      Breath sounds: Normal breath sounds.   Cardiovascular:      PMI at left midclavicular line. Normal rate. Regular rhythm.      Murmurs: There is a systolic murmur.   Abdominal:      Palpations: Abdomen is soft.   Musculoskeletal: Normal range of motion.      Cervical back: Normal range of motion and neck supple. Skin:     General: Skin is warm and dry.   Neurological:      Mental Status: Alert.           ECG 12 Lead    Date/Time: 1/24/2024 12:39 PM  Performed by: Kristy Mario APRN    Authorized by: Kristy Mario APRN  Comparison: compared with previous ECG from 3/2/2023  Rhythm: sinus rhythm  BPM: 64    Clinical impression: abnormal EKG  Comments: Normal QT               Diagnoses and all orders for this visit:    1. PAF (paroxysmal atrial fibrillation) (Primary)  -     ECG 12 Lead    2. Palpitations    3. Essential " hypertension    4. Obesity (BMI 30.0-34.9)    Other orders  -     dilTIAZem CD (CARDIZEM CD) 180 MG 24 hr capsule; Take 1 capsule by mouth Daily.  Dispense: 90 capsule; Refill: 2  -     hydroCHLOROthiazide (HYDRODIURIL) 12.5 MG tablet; Take 1 tablet by mouth Every Morning.  Dispense: 90 tablet; Refill: 2  -     losartan (COZAAR) 50 MG tablet; Take 1 tablet by mouth Daily.  Dispense: 90 tablet; Refill: 2  -     minoxidil (LONITEN) 2.5 MG tablet; Take 1 tablet by mouth 2 (Two) Times a Day.  Dispense: 90 tablet; Refill: 2                PAF: EKG done today showed a sinus with normal QT, similar to prior. She has remained on ASA alone per EP instructions as she has maintained NSR for many years. Will continue with cardizem therapy for rate control. Will remain followed by EP. LOOP monitored by EP.     HTN: BP stable. No changes to current HCTZ, cardizem, minoxidil, or cardizem urged. Limited sodium urged.      Cardiac status: Appears stable. No new concerns voiced. ASA continued. No new workup urged.     Refills per request.     BMI noted at 32.74, good cardiac diet with limited carbs, calories, and walking regimen encouraged.      6 month follow up advised, or sooner if needed.           Problems Addressed this Visit          Cardiac and Vasculature    PAF (paroxysmal atrial fibrillation) - Primary    Relevant Medications    dilTIAZem CD (CARDIZEM CD) 180 MG 24 hr capsule    Other Relevant Orders    ECG 12 Lead    Essential hypertension    Relevant Medications    dilTIAZem CD (CARDIZEM CD) 180 MG 24 hr capsule    hydroCHLOROthiazide (HYDRODIURIL) 12.5 MG tablet    losartan (COZAAR) 50 MG tablet    minoxidil (LONITEN) 2.5 MG tablet    Palpitations     Other Visit Diagnoses       Obesity (BMI 30.0-34.9)              Diagnoses         Codes Comments    PAF (paroxysmal atrial fibrillation)    -  Primary ICD-10-CM: I48.0  ICD-9-CM: 427.31     Palpitations     ICD-10-CM: R00.2  ICD-9-CM: 785.1     Essential hypertension      ICD-10-CM: I10  ICD-9-CM: 401.9     Obesity (BMI 30.0-34.9)     ICD-10-CM: E66.9  ICD-9-CM: 278.00                     Electronically signed by SHARITA Roberson January 24, 2024 17:20 EST

## 2024-03-20 PROBLEM — R00.2 PALPITATIONS: Status: RESOLVED | Noted: 2023-06-23 | Resolved: 2024-03-20

## 2024-07-24 ENCOUNTER — OFFICE VISIT (OUTPATIENT)
Dept: CARDIOLOGY | Facility: CLINIC | Age: 74
End: 2024-07-24
Payer: MEDICARE

## 2024-07-24 VITALS
DIASTOLIC BLOOD PRESSURE: 82 MMHG | HEART RATE: 70 BPM | HEIGHT: 63 IN | WEIGHT: 178.2 LBS | SYSTOLIC BLOOD PRESSURE: 138 MMHG | BODY MASS INDEX: 31.57 KG/M2

## 2024-07-24 DIAGNOSIS — R00.2 PALPITATIONS: ICD-10-CM

## 2024-07-24 DIAGNOSIS — I48.0 PAF (PAROXYSMAL ATRIAL FIBRILLATION): Primary | ICD-10-CM

## 2024-07-24 DIAGNOSIS — E66.9 OBESITY (BMI 30.0-34.9): ICD-10-CM

## 2024-07-24 DIAGNOSIS — Z95.818 IMPLANTABLE LOOP RECORDER PRESENT: ICD-10-CM

## 2024-07-24 DIAGNOSIS — G62.9 NEUROPATHY: ICD-10-CM

## 2024-07-24 DIAGNOSIS — I10 ESSENTIAL HYPERTENSION: ICD-10-CM

## 2024-07-24 PROCEDURE — 99213 OFFICE O/P EST LOW 20 MIN: CPT | Performed by: NURSE PRACTITIONER

## 2024-07-24 PROCEDURE — 3075F SYST BP GE 130 - 139MM HG: CPT | Performed by: NURSE PRACTITIONER

## 2024-07-24 PROCEDURE — 3079F DIAST BP 80-89 MM HG: CPT | Performed by: NURSE PRACTITIONER

## 2024-07-24 PROCEDURE — 93000 ELECTROCARDIOGRAM COMPLETE: CPT | Performed by: NURSE PRACTITIONER

## 2024-07-24 RX ORDER — HYDROCHLOROTHIAZIDE 12.5 MG/1
12.5 TABLET ORAL EVERY MORNING
Qty: 90 TABLET | Refills: 2 | Status: SHIPPED | OUTPATIENT
Start: 2024-07-24

## 2024-07-24 RX ORDER — LOSARTAN POTASSIUM 50 MG/1
50 TABLET ORAL DAILY
Qty: 90 TABLET | Refills: 2 | Status: SHIPPED | OUTPATIENT
Start: 2024-07-24

## 2024-07-24 RX ORDER — DILTIAZEM HYDROCHLORIDE 180 MG/1
180 CAPSULE, COATED, EXTENDED RELEASE ORAL DAILY
Qty: 90 CAPSULE | Refills: 2 | Status: SHIPPED | OUTPATIENT
Start: 2024-07-24

## 2024-07-24 NOTE — PROGRESS NOTES
Chief Complaint   Patient presents with    Follow-up     Cardiac Management - Patient denies any chest pain, palpitations, leg swelling, or shortness of breath. Patient states she has had a few falls since she seen us last and she has been fatigued. Patient states she also has been having more tremors and muscle spasms at night but has been seeing her PCP over that.     Labs     Patient had her labs drawn recently at her PCP last week. Patient states her lab work was good.     Med Refill     Patient states she does not think she needs any refills from us today at this time. Patient states her PCP has been refilling her medications.       Cardiac Complaints  none      Subjective   Geno Verduzco is a 73 y.o. female with HTN, palpitations, and PAF. She had been followed by EP for many years, who instructed ASA alone as her rhythm had been well managed. She was seen initially here in 2016 when she was having palpitations. At that time, she underwent stress and echo. Stress showed no ischemia and good LV function. She was referred back to the office in August 2022 for elevated BP. Regular stress was inconclusive as she failed to reach target HR, but significant HTN response noted, minoxidil added at 2.5mg daily.  She then called in Feb 2023 with right hand pain and tingling, Doppler US recommended as well as labs. Negative for concerns. Elevated d dimer noted, CTA of chest negative for PE.     She comes today for follow up and denies any CP, SOA, palpitations, swelling, or syncope. Most recent remote LOOP check 12/2023 showed adequate battery life,no AMS, no afib.  She did admit to more tremors, followed by PCP. More falls and fatigue since last visit. She states falls are happening because she is dragging her foot, sciatica. No refills needed, followed by PCP. Labs with PCP.      Cardiac History  Past Surgical History:   Procedure Laterality Date    CARDIOVASCULAR STRESS TEST  09/15/2009    9 Min. 10.2 METS 160/80. EF  70%. Negative    CARDIOVASCULAR STRESS TEST  09/07/2022    R.Stress- 4 Min.7.0 METS.63% THR.215/101. Inconclusive    CONVERTED (HISTORICAL) HOLTER  07/17/2009    AVG HR 71 BPM. PAF Seen    ECHO - CONVERTED  07/03/2009    @[facility].- Normal    ECHO - CONVERTED  12/15/2016    EF 55-60%, mild MR, RVSp 23 mmHg    ECHO - CONVERTED  09/07/2022    EF 65%. LA- 4.1. Trace-Mild MR. RVSP- 23 mmHg    EYE SURGERY  2023    lid    FOOT SURGERY Left        Current Outpatient Medications   Medication Sig Dispense Refill    aspirin 81 MG EC tablet Take 1 tablet by mouth Daily.      Cholecalciferol (VITAMIN D3) 5000 UNITS tablet tablet Take 1 tablet by mouth Daily.      dilTIAZem CD (CARDIZEM CD) 180 MG 24 hr capsule Take 1 capsule by mouth Daily. 90 capsule 2    estradiol (ESTRACE) 0.1 MG/GM vaginal cream Insert 2 g into the vagina 2 (Two) Times a Week.      hydroCHLOROthiazide 12.5 MG tablet Take 1 tablet by mouth Every Morning. 90 tablet 2    losartan (COZAAR) 50 MG tablet Take 1 tablet by mouth Daily. 90 tablet 2    montelukast (SINGULAIR) 10 MG tablet Take 1 tablet by mouth Every Morning.      multivitamin with minerals tablet tablet Take 1 tablet by mouth Daily.      naproxen sodium (ALEVE) 220 MG tablet Take 1 tablet by mouth 2 (Two) Times a Day As Needed.       No current facility-administered medications for this visit.       Bactrim [sulfamethoxazole-trimethoprim]    Past Medical History:   Diagnosis Date    Bursitis     PAF (paroxysmal atrial fibrillation)     Polymyalgia        Social History     Socioeconomic History    Marital status:    Tobacco Use    Smoking status: Never    Smokeless tobacco: Never   Vaping Use    Vaping status: Never Used   Substance and Sexual Activity    Alcohol use: No    Drug use: No    Sexual activity: Defer       Family History   Problem Relation Age of Onset    Heart disease Mother         PPM    Hypertension Mother     Stroke Mother     Valvular heart disease Mother     Kidney  "failure Father     Valvular heart disease Sister     Deep vein thrombosis Brother     Cancer Maternal Grandmother     Heart attack Maternal Grandfather     Kidney failure Paternal Grandfather     No Known Problems Daughter     No Known Problems Son        Review of Systems   Constitutional: Negative for malaise/fatigue and night sweats.   Cardiovascular:  Negative for chest pain, claudication, dyspnea on exertion, irregular heartbeat, leg swelling, near-syncope, orthopnea, palpitations and syncope.   Respiratory:  Negative for cough, shortness of breath and wheezing.    Musculoskeletal:  Negative for back pain, joint pain and stiffness.   Gastrointestinal:  Negative for anorexia, heartburn, nausea and vomiting.   Genitourinary:  Negative for dysuria, hematuria, hesitancy and nocturia.   Neurological:  Positive for disturbances in coordination and paresthesias. Negative for dizziness, light-headedness and loss of balance.   Psychiatric/Behavioral:  Negative for depression and memory loss. The patient is not nervous/anxious.            Objective     /82 (BP Location: Left arm, Patient Position: Sitting, Cuff Size: Adult)   Pulse 70   Ht 160 cm (63\")   Wt 80.8 kg (178 lb 3.2 oz)   BMI 31.57 kg/m²     Constitutional:       Appearance: Not in distress.   Eyes:      Pupils: Pupils are equal, round, and reactive to light.   HENT:      Nose: Nose normal.   Pulmonary:      Effort: Pulmonary effort is normal.      Breath sounds: Normal breath sounds.   Cardiovascular:      PMI at left midclavicular line. Normal rate. Regular rhythm.      Murmurs: There is a systolic murmur.   Abdominal:      Palpations: Abdomen is soft.   Musculoskeletal: Normal range of motion.      Cervical back: Normal range of motion and neck supple. Skin:     General: Skin is warm and dry.   Neurological:      Mental Status: Alert.           ECG 12 Lead    Date/Time: 7/24/2024 1:04 PM  Performed by: Kristy Mario APRN    Authorized by: " Kristy Mario, APRN  Comparison: compared with previous ECG from 1/24/2024  Similar to previous ECG  Rhythm: sinus rhythm  BPM: 61    Clinical impression: normal ECG  Comments: Normal QT               Diagnoses and all orders for this visit:    1. PAF (paroxysmal atrial fibrillation) (Primary)  -     ECG 12 Lead    2. Essential hypertension    3. Palpitations    4. Implantable loop recorder present    5. Neuropathy    6. Obesity (BMI 30.0-34.9)    Other orders  -     dilTIAZem CD (CARDIZEM CD) 180 MG 24 hr capsule; Take 1 capsule by mouth Daily.  Dispense: 90 capsule; Refill: 2  -     hydroCHLOROthiazide 12.5 MG tablet; Take 1 tablet by mouth Every Morning.  Dispense: 90 tablet; Refill: 2  -     losartan (COZAAR) 50 MG tablet; Take 1 tablet by mouth Daily.  Dispense: 90 tablet; Refill: 2             PAF/palpitations: EKG done today showed a sinus with normal QT, similar to prior. She has remained on ASA alone per EP instructions as she has maintained NSR for many years, no afib noted on LOOP. Will continue with cardizem therapy for rate control. Will remain followed by EP, sees them in October.      HTN: BP stable. No changes to current HCTZ, cozaar, or cardizem urged. Limited sodium urged. Will not add minoxidil back to current as BP well managed.      Cardiac status: Appears stable. No new concerns voiced. ASA continued. No new workup urged. Will call with concerns.    Labs with your office, checked this week, can we have for review?     Refills per request.     BMI noted at 31.57, good cardiac diet with limited carbs, calories, and walking regimen encouraged.      6 month follow up advised, or sooner if needed.          Problems Addressed this Visit          Cardiac and Vasculature    PAF (paroxysmal atrial fibrillation) - Primary    Relevant Medications    dilTIAZem CD (CARDIZEM CD) 180 MG 24 hr capsule    Other Relevant Orders    ECG 12 Lead    Essential hypertension    Relevant Medications    dilTIAZem CD  (CARDIZEM CD) 180 MG 24 hr capsule    hydroCHLOROthiazide 12.5 MG tablet    losartan (COZAAR) 50 MG tablet       Other    Implantable loop recorder present     Other Visit Diagnoses       Palpitations        Neuropathy        Obesity (BMI 30.0-34.9)              Diagnoses         Codes Comments    PAF (paroxysmal atrial fibrillation)    -  Primary ICD-10-CM: I48.0  ICD-9-CM: 427.31     Essential hypertension     ICD-10-CM: I10  ICD-9-CM: 401.9     Palpitations     ICD-10-CM: R00.2  ICD-9-CM: 785.1     Implantable loop recorder present     ICD-10-CM: Z95.818  ICD-9-CM: V45.09     Neuropathy     ICD-10-CM: G62.9  ICD-9-CM: 355.9     Obesity (BMI 30.0-34.9)     ICD-10-CM: E66.9  ICD-9-CM: 278.00                     Electronically signed by Kristy Mario, SHARITA July 24, 2024 16:10 EDT

## 2024-10-04 ENCOUNTER — OFFICE VISIT (OUTPATIENT)
Dept: CARDIOLOGY | Facility: CLINIC | Age: 74
End: 2024-10-04
Payer: MEDICARE

## 2024-10-04 VITALS
SYSTOLIC BLOOD PRESSURE: 134 MMHG | DIASTOLIC BLOOD PRESSURE: 68 MMHG | OXYGEN SATURATION: 96 % | HEART RATE: 63 BPM | WEIGHT: 174 LBS | HEIGHT: 63 IN | BODY MASS INDEX: 30.83 KG/M2

## 2024-10-04 DIAGNOSIS — I48.0 PAF (PAROXYSMAL ATRIAL FIBRILLATION): ICD-10-CM

## 2024-10-04 DIAGNOSIS — I10 ESSENTIAL HYPERTENSION: ICD-10-CM

## 2024-10-04 DIAGNOSIS — I48.0 PAROXYSMAL ATRIAL FIBRILLATION: Primary | ICD-10-CM

## 2024-10-04 RX ORDER — MINOXIDIL 2.5 MG/1
2.5 TABLET ORAL DAILY
COMMUNITY

## 2024-10-04 NOTE — PROGRESS NOTES
Patient ID: Geno Verduzco is a 74 y.o. female.    Pawel Brunner MD    Chief Complaint: PAF (paroxysmal atrial fibrillation)      PROBLEM LIST:  Patient Active Problem List    Diagnosis Date Noted    Implantable loop recorder present 06/23/2023    Metabolic syndrome 08/25/2022    Bradycardia, sinus 08/25/2022    Murmur, cardiac 08/25/2022    Silent myocardial ischemia 08/25/2022    History of 2019 novel coronavirus disease (COVID-19) 06/03/2022    PAF (paroxysmal atrial fibrillation) 12/13/2016     Note Last Updated: 6/3/2022     A. Onset 07/04/09  B. Holter monitor,07/15/09 with 3% PACs, runs of atrial fibrillation, heart rate ; average 71 BPM  C. EKG, 08/18/09 with atrial fibrillation 99 BPM  D. Sotalol initiated 08/18/09  E. Sotalol discontinued due to side effects with initiation of Tambocor.  F. Stress Cardiolite, 09/15/09: Normal LV size and function, LVEF 70%, no ishemic changes  G. Rapid apparent SVT on Tambocor with subsequent discontinuation, 10/09  H. Loop recorder implantation, 2/10/2021 [employer]      Essential hypertension 12/13/2016    Polymyalgia 12/13/2016               History of Present Illness  Patient presents today for follow-up with a history of atrial fibrillation in 2009.  She has an implantable loop recorder and has had no recorded atrial fibrillation since 2009.   Since we last saw the pt, pt denies any AF episodes, SOB, CP, LH, and dizziness. Denies any hospitalizations, ER visits, bleeding, or TIA/CVA symptoms. Overall feels well.   Allergies   Allergen Reactions    Bactrim [Sulfamethoxazole-Trimethoprim] Itching       Current Outpatient Medications   Medication Instructions    aspirin 81 mg, Oral, Daily    dilTIAZem CD (CARDIZEM CD) 180 mg, Oral, 2 times daily    estradiol (ESTRACE) 2 g, Vaginal, 2 Times Weekly    hydroCHLOROthiazide (HYDRODIURIL) 12.5 mg, Oral, Every Morning    losartan (COZAAR) 50 mg, Oral, Daily    minoxidil (LONITEN) 2.5 mg, Oral, 2 Times Daily  "   montelukast (SINGULAIR) 10 mg, Oral, Every Morning    multivitamin with minerals tablet tablet 1 tablet, Oral, Daily    naproxen sodium (ALEVE) 220 mg, Oral, 2 Times Daily PRN    POTASSIUM PO 1 tablet, Oral, Daily    vitamin D3 5,000 Units, Oral, Daily         Objective:     /68 (BP Location: Right arm, Patient Position: Sitting)   Pulse 63   Ht 160 cm (63\")   Wt 78.9 kg (174 lb)   SpO2 96%   BMI 30.82 kg/m²    Body mass index is 30.82 kg/m².     Vitals reviewed.   Constitutional:       Appearance: Well-developed.   Pulmonary:      Effort: Pulmonary effort is normal. No respiratory distress.      Breath sounds: Normal breath sounds. No wheezing. No rales.      Comments: Bases clear  Chest:      Chest wall: Not tender to palpatation.   Cardiovascular:      Normal rate. Regular rhythm.      Murmurs: There is no murmur.      No gallop.  No click. No rub.   Pulses:     Intact distal pulses.   Edema:     Peripheral edema absent.   Musculoskeletal: Normal range of motion.       Lab Review:                       CHADS-VASc Risk Assessment              3 Total Score    1 Hypertension    1 Age 65-74    1 Sex: Female        Criteria that do not apply:    CHF    Age >/= 75    DM    PRIOR STROKE/TIA/THROMBO    Vascular Disease                Procedures   Echocardiogram 2023  Left ventricular wall thickness is consistent with borderline concentric hypertrophy.  Left ventricular ejection fraction appears to be 61 - 65%.  The left atrial cavity is mildly dilated. 4.1 Cm  No aortic valve regurgitation or stenosis is present  Trace to mild mitral valve regurgitation  Trace to mild tricuspid valve regurgitation is present. RVSP- 23 mmHg             Assessment:      Diagnosis Plan   1. PAF (paroxysmal atrial fibrillation) (HCC)  No known recurrence of atrial fibrillation.  She remains on low-dose aspirin only.  I have reviewed her stroke risk based on her risk factors as noted above.  On one hand, her [id number] score " is 5 she may take Eliquis as needed.  However, on the other hand, she has not had a recorded reoccurrence of atrial fibrillation since and originally presented in in 2009.  She understands her stroke risk and wishes to continue current treatment.      2. Essential hypertension  Controlled on current medical therapy      3. Implantable loop recorder present    This patient's Cardiac Implanted Electronic Device was manually interrogated and reprogrammed during the patient encounter today.  Iterative programming changes were manually made to determine the sensing threshold, pacing threshold, lead impedance as well as underlying cardiac rhythm.  These programming changes were not limited to but included some or all of the following when appropriate: pacing mode, programmed AV delays, blanking periods, and refractory periods.  Data obtained as a result of these manual programing changes informed the patient's CIED permanent programming.    Battery voltage good. One year.  Underlying rhythm sinus rhythm.  No observations.      4. Edema: Stable, venous insufficiency         Plan:   Stable course return follow-up or office 1 year sooner as needed.  Electronically signed by ANDREW Rogers, 10/04/24, 1:56 PM EDT.

## 2024-11-13 ENCOUNTER — TELEPHONE (OUTPATIENT)
Dept: CARDIOLOGY | Facility: CLINIC | Age: 74
End: 2024-11-13
Payer: MEDICARE

## 2024-11-13 NOTE — TELEPHONE ENCOUNTER
Rx Refill Note  Requested Prescriptions      No prescriptions requested or ordered in this encounter      Last office visit with prescribing clinician: 7/24/2024   Last telemedicine visit with prescribing clinician: Visit date not found   Next office visit with prescribing clinician: 1/30/2025                         Would you like a call back once the refill request has been completed: [] Yes [] No    If the office needs to give you a call back, can they leave a voicemail: [] Yes [] No    Yacny Jj CMA  11/13/24, 10:52 EST

## 2024-11-15 RX ORDER — DILTIAZEM HYDROCHLORIDE EXTENDED-RELEASE TABLETS 180 MG/1
180 TABLET, EXTENDED RELEASE ORAL DAILY
COMMUNITY

## 2024-11-15 NOTE — TELEPHONE ENCOUNTER
Rx Refill Note  Requested Prescriptions      No prescriptions requested or ordered in this encounter      Last office visit with prescribing clinician: 7/24/2024   Last telemedicine visit with prescribing clinician: Visit date not found   Next office visit with prescribing clinician: 1/30/2025                         Would you like a call back once the refill request has been completed: [] Yes [] No    If the office needs to give you a call back, can they leave a voicemail: [] Yes [] No    Yancy Jj CMA  11/15/24, 12:00 EST

## 2024-11-18 RX ORDER — DILTIAZEM HYDROCHLORIDE EXTENDED-RELEASE TABLETS 180 MG/1
180 TABLET, EXTENDED RELEASE ORAL DAILY
Qty: 90 TABLET | Refills: 1 | OUTPATIENT
Start: 2024-11-18

## 2025-01-30 ENCOUNTER — OFFICE VISIT (OUTPATIENT)
Dept: CARDIOLOGY | Facility: CLINIC | Age: 75
End: 2025-01-30
Payer: MEDICARE

## 2025-01-30 VITALS
WEIGHT: 178.6 LBS | HEIGHT: 63 IN | DIASTOLIC BLOOD PRESSURE: 74 MMHG | SYSTOLIC BLOOD PRESSURE: 122 MMHG | HEART RATE: 61 BPM | BODY MASS INDEX: 31.64 KG/M2

## 2025-01-30 DIAGNOSIS — E66.811 OBESITY (BMI 30.0-34.9): ICD-10-CM

## 2025-01-30 DIAGNOSIS — R00.2 PALPITATIONS: ICD-10-CM

## 2025-01-30 DIAGNOSIS — Z95.818 IMPLANTABLE LOOP RECORDER PRESENT: ICD-10-CM

## 2025-01-30 DIAGNOSIS — R12 HEARTBURN: ICD-10-CM

## 2025-01-30 DIAGNOSIS — I48.0 PAF (PAROXYSMAL ATRIAL FIBRILLATION): Primary | ICD-10-CM

## 2025-01-30 DIAGNOSIS — I10 ESSENTIAL HYPERTENSION: ICD-10-CM

## 2025-01-30 PROCEDURE — 93000 ELECTROCARDIOGRAM COMPLETE: CPT | Performed by: NURSE PRACTITIONER

## 2025-01-30 PROCEDURE — 3078F DIAST BP <80 MM HG: CPT | Performed by: NURSE PRACTITIONER

## 2025-01-30 PROCEDURE — 99214 OFFICE O/P EST MOD 30 MIN: CPT | Performed by: NURSE PRACTITIONER

## 2025-01-30 PROCEDURE — 3074F SYST BP LT 130 MM HG: CPT | Performed by: NURSE PRACTITIONER

## 2025-01-30 RX ORDER — OMEPRAZOLE 40 MG/1
40 CAPSULE, DELAYED RELEASE ORAL DAILY
Qty: 90 CAPSULE | Refills: 2 | Status: SHIPPED | OUTPATIENT
Start: 2025-01-30

## 2025-01-30 RX ORDER — DILTIAZEM HYDROCHLORIDE 180 MG/1
1 CAPSULE, EXTENDED RELEASE ORAL EVERY 12 HOURS SCHEDULED
COMMUNITY
Start: 2024-11-15

## 2025-01-30 RX ORDER — DICYCLOMINE HYDROCHLORIDE 10 MG/1
10 CAPSULE ORAL
Qty: 90 CAPSULE | Refills: 1 | Status: SHIPPED | OUTPATIENT
Start: 2025-01-30

## 2025-01-30 NOTE — PROGRESS NOTES
Chief Complaint   Patient presents with    Follow-up     Cardiac management - Patient states she has been having problems with her reflux. Patient states other than that she's doing okay. Patient denies any chest pains, palpitations, shortness of breath or swelling in the legs.     Labs Only     Patient states she has had more recent lab work with PCP Dr. Brunner.     Med Refill     Patient states she does not need any refills from us at this time.        Cardiac Complaints  heartburn      Subjective   Geno Verduzco is a 74 y.o. female with HTN, palpitations, and PAF. She had been followed by EP for many years, who instructed ASA alone as her rhythm had been well managed. She was seen initially here in 2016 when she was having palpitations. At that time, she underwent stress and echo. Stress showed no ischemia and good LV function. She was referred back to the office in August 2022 for elevated BP. Regular stress was inconclusive as she failed to reach target HR, but significant HTN response noted, minoxidil added at 2.5mg daily.  She then called in Feb 2023 with right hand pain and tingling, Doppler US recommended as well as labs. Negative for concerns. Elevated d dimer noted, CTA of chest negative for PE. Most recent remote LOOP check 10/24 showed adequate battery life,no AMS, no afib, 1 year check urged.     She comes today for follow up and admits to more reflux and chest tightness associated with it. She admits she had this kind of heartburn in her 50's but has not had anything since then until now. She denies palpitations, SOA, dizziness, or syncope. She has labs recently done with Dr. Brunner, no current available. Refills not needed.       Cardiac History  Past Surgical History:   Procedure Laterality Date    CARDIOVASCULAR STRESS TEST  09/15/2009    9 Min. 10.2 METS 160/80. EF 70%. Negative    CARDIOVASCULAR STRESS TEST  09/07/2022    R.Stress- 4 Min.7.0 METS.63% THR.215/101. Inconclusive    CONVERTED  (HISTORICAL) HOLTER  07/17/2009    AVG HR 71 BPM. PAF Seen    ECHO - CONVERTED  07/03/2009    @[facility].- Normal    ECHO - CONVERTED  12/15/2016    EF 55-60%, mild MR, RVSp 23 mmHg    ECHO - CONVERTED  09/07/2022    EF 65%. LA- 4.1. Trace-Mild MR. RVSP- 23 mmHg    EYE SURGERY  2023    lid    FOOT SURGERY Left        Current Outpatient Medications   Medication Sig Dispense Refill    aspirin 81 MG EC tablet Take 1 tablet by mouth Daily.      Cholecalciferol (VITAMIN D3) 5000 UNITS tablet tablet Take 1 tablet by mouth Daily.      dilTIAZem (TIAZAC) 180 MG 24 hr capsule Take 1 capsule by mouth Every 12 (Twelve) Hours.      estradiol (ESTRACE) 0.1 MG/GM vaginal cream Insert 2 g into the vagina 2 (Two) Times a Week.      hydroCHLOROthiazide 12.5 MG tablet Take 1 tablet by mouth Every Morning. 90 tablet 2    losartan (COZAAR) 50 MG tablet Take 1 tablet by mouth Daily. 90 tablet 2    montelukast (SINGULAIR) 10 MG tablet Take 1 tablet by mouth Every Morning.      multivitamin with minerals tablet tablet Take 1 tablet by mouth Daily.      naproxen sodium (ALEVE) 220 MG tablet Take 1 tablet by mouth 2 (Two) Times a Day As Needed.      dicyclomine (BENTYL) 10 MG capsule Take 1 capsule by mouth 3 (Three) Times a Day Before Meals. 90 capsule 1    omeprazole (priLOSEC) 40 MG capsule Take 1 capsule by mouth Daily. 90 capsule 2     No current facility-administered medications for this visit.       Bactrim [sulfamethoxazole-trimethoprim]    Past Medical History:   Diagnosis Date    Bursitis     PAF (paroxysmal atrial fibrillation)     Polymyalgia        Social History     Socioeconomic History    Marital status:    Tobacco Use    Smoking status: Never    Smokeless tobacco: Never   Vaping Use    Vaping status: Never Used   Substance and Sexual Activity    Alcohol use: No    Drug use: No    Sexual activity: Defer       Family History   Problem Relation Age of Onset    Heart disease Mother         PPM    Hypertension Mother  "    Stroke Mother     Valvular heart disease Mother     Kidney failure Father     Valvular heart disease Sister     Deep vein thrombosis Brother     Cancer Maternal Grandmother     Heart attack Maternal Grandfather     Kidney failure Paternal Grandfather     No Known Problems Daughter     No Known Problems Son        Review of Systems   Constitutional: Negative for malaise/fatigue and night sweats.   Cardiovascular:  Negative for chest pain, claudication, dyspnea on exertion, irregular heartbeat, near-syncope, orthopnea, palpitations and syncope.   Respiratory:  Negative for cough, shortness of breath and wheezing.    Musculoskeletal:  Negative for joint pain, muscle cramps and stiffness.   Gastrointestinal:  Positive for heartburn. Negative for anorexia, nausea and vomiting.   Genitourinary:  Negative for dysuria, hematuria, hesitancy and nocturia.   Neurological:  Negative for dizziness, light-headedness and loss of balance.   Psychiatric/Behavioral:  Negative for depression and memory loss. The patient is not nervous/anxious.            Objective     /74 (BP Location: Left arm, Patient Position: Sitting, Cuff Size: Adult)   Pulse 61   Ht 160 cm (63\")   Wt 81 kg (178 lb 9.6 oz)   BMI 31.64 kg/m²     Constitutional:       Appearance: Not in distress.   Eyes:      Pupils: Pupils are equal, round, and reactive to light.   HENT:      Nose: Nose normal.   Pulmonary:      Effort: Pulmonary effort is normal.      Breath sounds: Normal breath sounds.   Cardiovascular:      PMI at left midclavicular line. Normal rate. Regular rhythm.      Murmurs: There is a systolic murmur.   Abdominal:      Palpations: Abdomen is soft.   Musculoskeletal: Normal range of motion.      Cervical back: Normal range of motion and neck supple. Skin:     General: Skin is warm and dry.   Neurological:      Mental Status: Alert.           ECG 12 Lead    Date/Time: 1/30/2025 1:44 PM  Performed by: Kristy Mario APRN    Authorized by: " Kristy Mario, APRN  Comparison: compared with previous ECG from 7/24/2024  Rhythm: sinus rhythm  BPM: 61    Clinical impression: abnormal EKG  Comments: Normal QT               Diagnoses and all orders for this visit:    1. PAF (paroxysmal atrial fibrillation) (Primary)  -     ECG 12 Lead    2. Essential hypertension    3. Heartburn    4. Palpitations    5. Implantable loop recorder present    6. Obesity (BMI 30.0-34.9)    Other orders  -     omeprazole (priLOSEC) 40 MG capsule; Take 1 capsule by mouth Daily.  Dispense: 90 capsule; Refill: 2  -     dicyclomine (BENTYL) 10 MG capsule; Take 1 capsule by mouth 3 (Three) Times a Day Before Meals.  Dispense: 90 capsule; Refill: 1             PAF/palpitations: EKG done today showed a sinus with normal QT, similar to prior. She has remained on ASA alone per EP instructions as she has maintained NSR for many years, no afib noted on LOOP, even most recent LOOP. Will continue with cardizem therapy for rate control. Will remain followed by EP, now yearly follow ups urged.     HTN: BP stable. No changes to current HCTZ, cozaar, or cardizem urged. Limited sodium urged. Will not add minoxidil back to current as BP well managed.      Cardiac status/heartburn: More GI concerns, heartburn reported. She was urged to add PPI for 30 days and add bentyl TID. Will assess symptoms with addition. If no improvement, will encourage on repeat cardiac workup.  She did not wish to proceed today, would like to try medication first.  Labs with your office, checked this week, can we have for review?     Refills per request.     BMI noted at 31.57, good cardiac diet with limited carbs, calories, and walking regimen encouraged.      6 month follow up advised, or sooner if needed.           Problems Addressed this Visit          Cardiac and Vasculature    PAF (paroxysmal atrial fibrillation) - Primary    Relevant Medications    dilTIAZem (TIAZAC) 180 MG 24 hr capsule    Other Relevant Orders     ECG 12 Lead    Essential hypertension    Relevant Medications    dilTIAZem (TIAZAC) 180 MG 24 hr capsule       Other    Implantable loop recorder present     Other Visit Diagnoses       Heartburn        Palpitations        Obesity (BMI 30.0-34.9)              Diagnoses         Codes Comments    PAF (paroxysmal atrial fibrillation)    -  Primary ICD-10-CM: I48.0  ICD-9-CM: 427.31     Essential hypertension     ICD-10-CM: I10  ICD-9-CM: 401.9     Heartburn     ICD-10-CM: R12  ICD-9-CM: 787.1     Palpitations     ICD-10-CM: R00.2  ICD-9-CM: 785.1     Implantable loop recorder present     ICD-10-CM: Z95.818  ICD-9-CM: V45.09     Obesity (BMI 30.0-34.9)     ICD-10-CM: E66.811  ICD-9-CM: 278.00                       Electronically signed by Kristy Mario, APRN January 30, 2025 16:32 EST

## 2025-03-25 ENCOUNTER — TELEPHONE (OUTPATIENT)
Dept: CARDIOLOGY | Facility: CLINIC | Age: 75
End: 2025-03-25
Payer: MEDICARE

## 2025-03-25 NOTE — TELEPHONE ENCOUNTER
Caller: Geno Verduzco    Relationship: Self    Best call back number: [phone]    What is the best time to reach you: ANY    Who are you requesting to speak with (clinical staff, provider,  specific staff member): CLINICAL      What was the call regarding: PATIENT IS CALLING AS AT NIGHT THEIR BP IS INCREASING, AND AT NIGHT THEY WERE HAVING PRESSURE IN THE CHEST, NO CURRENT SYMPTOMS PLEASE ADVISE.     Is it okay if the provider responds through MyChart: NO

## 2025-03-26 NOTE — TELEPHONE ENCOUNTER
Spoke with patient regarding blood pressure.     She states in the evenings her BP is being elevated. Running 161/50, 158/76, 148/78, 162/78. HR has been running between 55-64. She states that she has not had any chest pain, but she has woke up in the night feeling like she needs to catch her breath.    Current medications:  Losartan 50 mg daily  HCTZ 12.5 mg daily  Diltiazem 180 mg BID    She is asking if she should add back losartan at night like she had been taking in the past?

## 2025-03-27 RX ORDER — LOSARTAN POTASSIUM 25 MG/1
25 TABLET ORAL NIGHTLY
Qty: 90 TABLET | Refills: 3 | Status: SHIPPED | OUTPATIENT
Start: 2025-03-27

## 2025-03-27 NOTE — TELEPHONE ENCOUNTER
Patient was made aware to add losartan 25 mg nightly. 90 tablets and 3 refills are pended to be sent to [facility] in [place].

## 2025-04-01 RX ORDER — DICYCLOMINE HYDROCHLORIDE 10 MG/1
CAPSULE ORAL
Qty: 90 CAPSULE | Refills: 0 | Status: SHIPPED | OUTPATIENT
Start: 2025-04-01

## 2025-07-09 RX ORDER — HYDROCHLOROTHIAZIDE 12.5 MG/1
12.5 TABLET ORAL EVERY MORNING
Qty: 90 TABLET | Refills: 0 | Status: SHIPPED | OUTPATIENT
Start: 2025-07-09

## 2025-07-09 RX ORDER — LOSARTAN POTASSIUM 50 MG/1
50 TABLET ORAL DAILY
Qty: 90 TABLET | Refills: 0 | Status: SHIPPED | OUTPATIENT
Start: 2025-07-09

## 2025-08-06 ENCOUNTER — OFFICE VISIT (OUTPATIENT)
Dept: CARDIOLOGY | Facility: CLINIC | Age: 75
End: 2025-08-06
Payer: MEDICARE

## 2025-08-06 VITALS
BODY MASS INDEX: 31.18 KG/M2 | HEART RATE: 61 BPM | SYSTOLIC BLOOD PRESSURE: 128 MMHG | HEIGHT: 63 IN | WEIGHT: 176 LBS | DIASTOLIC BLOOD PRESSURE: 68 MMHG

## 2025-08-06 DIAGNOSIS — E78.00 ELEVATED LDL CHOLESTEROL LEVEL: ICD-10-CM

## 2025-08-06 DIAGNOSIS — I48.0 PAF (PAROXYSMAL ATRIAL FIBRILLATION): Primary | ICD-10-CM

## 2025-08-06 DIAGNOSIS — E66.811 OBESITY (BMI 30.0-34.9): ICD-10-CM

## 2025-08-06 DIAGNOSIS — Z95.818 IMPLANTABLE LOOP RECORDER PRESENT: ICD-10-CM

## 2025-08-06 DIAGNOSIS — I10 ESSENTIAL HYPERTENSION: ICD-10-CM

## 2025-08-06 DIAGNOSIS — R00.2 PALPITATIONS: ICD-10-CM

## 2025-08-06 PROCEDURE — 99214 OFFICE O/P EST MOD 30 MIN: CPT | Performed by: NURSE PRACTITIONER

## 2025-08-06 PROCEDURE — 93000 ELECTROCARDIOGRAM COMPLETE: CPT | Performed by: NURSE PRACTITIONER

## 2025-08-06 PROCEDURE — 3074F SYST BP LT 130 MM HG: CPT | Performed by: NURSE PRACTITIONER

## 2025-08-06 PROCEDURE — 3078F DIAST BP <80 MM HG: CPT | Performed by: NURSE PRACTITIONER

## 2025-08-06 RX ORDER — DILTIAZEM HYDROCHLORIDE 180 MG/1
180 CAPSULE, EXTENDED RELEASE ORAL EVERY 12 HOURS SCHEDULED
Qty: 180 CAPSULE | Refills: 3 | Status: SHIPPED | OUTPATIENT
Start: 2025-08-06

## 2025-08-06 RX ORDER — CHLORAL HYDRATE 500 MG
1 CAPSULE ORAL DAILY
COMMUNITY

## 2025-08-06 RX ORDER — HYDROCHLOROTHIAZIDE 12.5 MG/1
12.5 TABLET ORAL EVERY MORNING
Qty: 90 TABLET | Refills: 2 | Status: SHIPPED | OUTPATIENT
Start: 2025-08-06

## 2025-08-06 RX ORDER — DICYCLOMINE HYDROCHLORIDE 10 MG/1
10 CAPSULE ORAL 2 TIMES DAILY
Qty: 180 CAPSULE | Refills: 2 | Status: SHIPPED | OUTPATIENT
Start: 2025-08-06

## 2025-08-06 RX ORDER — DICYCLOMINE HYDROCHLORIDE 10 MG/1
10 CAPSULE ORAL 2 TIMES DAILY
COMMUNITY
End: 2025-08-06 | Stop reason: SDUPTHER

## 2025-08-06 RX ORDER — LOSARTAN POTASSIUM 50 MG/1
50 TABLET ORAL DAILY
Qty: 90 TABLET | Refills: 2 | Status: SHIPPED | OUTPATIENT
Start: 2025-08-06

## 2025-08-06 RX ORDER — LOSARTAN POTASSIUM 25 MG/1
25 TABLET ORAL NIGHTLY
Qty: 90 TABLET | Refills: 3 | Status: SHIPPED | OUTPATIENT
Start: 2025-08-06